# Patient Record
Sex: MALE | Race: ASIAN | Employment: OTHER | URBAN - METROPOLITAN AREA
[De-identification: names, ages, dates, MRNs, and addresses within clinical notes are randomized per-mention and may not be internally consistent; named-entity substitution may affect disease eponyms.]

---

## 2017-10-03 ENCOUNTER — APPOINTMENT (OUTPATIENT)
Dept: LAB | Age: 72
End: 2017-10-03
Attending: FAMILY MEDICINE
Payer: MEDICARE

## 2017-10-03 ENCOUNTER — OFFICE VISIT (OUTPATIENT)
Dept: FAMILY MEDICINE CLINIC | Facility: CLINIC | Age: 72
End: 2017-10-03

## 2017-10-03 VITALS
BODY MASS INDEX: 23.57 KG/M2 | RESPIRATION RATE: 16 BRPM | HEIGHT: 67 IN | HEART RATE: 72 BPM | WEIGHT: 150.19 LBS | DIASTOLIC BLOOD PRESSURE: 70 MMHG | SYSTOLIC BLOOD PRESSURE: 110 MMHG | TEMPERATURE: 98 F

## 2017-10-03 DIAGNOSIS — Z00.00 ROUTINE ADULT HEALTH MAINTENANCE: Primary | ICD-10-CM

## 2017-10-03 DIAGNOSIS — Z00.00 ROUTINE ADULT HEALTH MAINTENANCE: ICD-10-CM

## 2017-10-03 DIAGNOSIS — D64.9 ANEMIA, UNSPECIFIED TYPE: ICD-10-CM

## 2017-10-03 DIAGNOSIS — R51.9 NONINTRACTABLE HEADACHE, UNSPECIFIED CHRONICITY PATTERN, UNSPECIFIED HEADACHE TYPE: ICD-10-CM

## 2017-10-03 PROCEDURE — 36415 COLL VENOUS BLD VENIPUNCTURE: CPT

## 2017-10-03 PROCEDURE — G0439 PPPS, SUBSEQ VISIT: HCPCS | Performed by: FAMILY MEDICINE

## 2017-10-03 PROCEDURE — 90653 IIV ADJUVANT VACCINE IM: CPT | Performed by: FAMILY MEDICINE

## 2017-10-03 PROCEDURE — G0008 ADMIN INFLUENZA VIRUS VAC: HCPCS | Performed by: FAMILY MEDICINE

## 2017-10-03 PROCEDURE — G0009 ADMIN PNEUMOCOCCAL VACCINE: HCPCS | Performed by: FAMILY MEDICINE

## 2017-10-03 PROCEDURE — 84153 ASSAY OF PSA TOTAL: CPT

## 2017-10-03 PROCEDURE — 90732 PPSV23 VACC 2 YRS+ SUBQ/IM: CPT | Performed by: FAMILY MEDICINE

## 2017-10-03 PROCEDURE — 85027 COMPLETE CBC AUTOMATED: CPT

## 2017-10-03 PROCEDURE — 80061 LIPID PANEL: CPT

## 2017-10-03 PROCEDURE — 80053 COMPREHEN METABOLIC PANEL: CPT

## 2017-10-05 NOTE — PROGRESS NOTES
HPI:   Regis Gonzalez is a 81year old male who presents for a Medicare Subsequent Annual Wellness visit (Pt already had Initial Annual Wellness).       His last annual assessment has been over 1 year: Annual Physical due on 10/01/2017         Patient Care T SOCIAL HISTORY:   He  reports that he has quit smoking. He has never used smokeless tobacco. He reports that he drinks alcohol. He reports that he does not use drugs.      REVIEW OF SYSTEMS:   GENERAL: feels well otherwise  SKIN: denies any unusual skin l S1, S2 normal, no murmur, rub or gallop   Abdomen:   Soft, non-tender, bowel sounds active all four quadrants,  no masses, no organomegaly   Genitalia: Normal male   Rectal: Normal tone, normal prostate, no masses or tenderness   Extremities: Extremities n Power of [employer] on file in Epic:    Cleo Nelson does not have a Power of  for [employer] on file in [facility].  Discussed with patient and provided information           PLAN:  The patient indicates understanding of these issues and agrees to the pl affect your day to day activities?: 0-No     Have you had any memory issues?: 0-No    Fall/Risk Scorin    Scoring Interpretation: 0 - 3 No Risk     Depression Screening (PHQ-2/PHQ-9): Over the LAST 2 WEEKS   Little interest or pleasure in doing things Glaucoma Screening      Ophthalmology Visit Annually: Diabetics, FHx Glaucoma, AA>50, > 65 No flowsheet data found.     Prostate Cancer Screening      PSA  Annually PSA due on 10/03/2018  Update Health Maintenance if applicable   Immunizations

## 2017-10-26 ENCOUNTER — OFFICE VISIT (OUTPATIENT)
Dept: NEUROLOGY | Facility: CLINIC | Age: 72
End: 2017-10-26

## 2017-10-26 VITALS — SYSTOLIC BLOOD PRESSURE: 114 MMHG | DIASTOLIC BLOOD PRESSURE: 64 MMHG | RESPIRATION RATE: 18 BRPM | HEART RATE: 84 BPM

## 2017-10-26 DIAGNOSIS — G43.009 MIGRAINE WITHOUT AURA AND WITHOUT STATUS MIGRAINOSUS, NOT INTRACTABLE: ICD-10-CM

## 2017-10-26 DIAGNOSIS — R29.818 OTHER SYMPTOMS AND SIGNS INVOLVING THE NERVOUS SYSTEM: ICD-10-CM

## 2017-10-26 DIAGNOSIS — R51.9 NEW ONSET OF HEADACHES AFTER AGE 50: Primary | ICD-10-CM

## 2017-10-26 PROCEDURE — 99204 OFFICE O/P NEW MOD 45 MIN: CPT | Performed by: OTHER

## 2017-10-26 RX ORDER — SUMATRIPTAN 50 MG/1
50 TABLET, FILM COATED ORAL EVERY 2 HOUR PRN
Qty: 9 TABLET | Refills: 0 | Status: SHIPPED | OUTPATIENT
Start: 2017-10-26 | End: 2017-11-25

## 2017-10-26 NOTE — PATIENT INSTRUCTIONS
For headaches, start taking Sumatriptan 50 mg (Imitrex) within first 30 minutes of symptoms starting; if not improved after 2 hours, take additional dose, and then stop taking; monitor for chest pain / tightness; take no more than 2 times a week  Have imag 3-10 days. It is highly recommended patients contact their insurance carrier directly to determine coverage. If test is done without insurance authorization, patient may be responsible for the entire amount billed.       Precertification and Prior [employer]

## 2017-10-26 NOTE — H&P
[facility] Patient / Consult Visit    Ruthy Jackson is a 81year old male.                          Referring MD: Aurora Hernandez    Patient presents with:  Headache      HPI:    Ruthy Jackson is a 81year old, who presents for evalua DOCUMENTED NOT TO HAVE EXPERIENCED ANY* N/A      Comment: Procedure: CIRCUMCISION ADULT;  Surgeon:                Ebonie Wu MD;  Location: [address] [place] [address]  3/11/2016: PATIENT WITH PREOPERATIVE ORDER FOR IV ANTIBIO* N/A bruits    Mental status:  Alert and oriented to time, place, person, and situation  Speech: fluent  Language: normal naming, repetition, and comprehension  Memory: normal  Attention/concentration: normal    Fundoscopic Exam: optic discs sharp bilaterally CEREBELLUM:     No edema, hemorrhage, mass, acute infarction, or        inappropriate atrophy. BRAINSTEM:     No edema, hemorrhage, mass, acute infarction, or        inappropriate atrophy.        CSF SPACES:     Ventricles, cisterns, and sulci are rodolfo significant for prostate CA, who presented for evaluation of headache. Neurologic exam is nonfocal, with no cranial neuropathy or upper motor neuron signs.   Patient does have left lower extremity weakness, which is mild and chronic, from prior injur

## 2017-11-09 ENCOUNTER — HOSPITAL ENCOUNTER (OUTPATIENT)
Dept: MRI IMAGING | Facility: HOSPITAL | Age: 72
Discharge: HOME | End: 2017-11-09
Attending: Other
Payer: MEDICARE

## 2017-11-09 DIAGNOSIS — R29.818 OTHER SYMPTOMS AND SIGNS INVOLVING THE NERVOUS SYSTEM: ICD-10-CM

## 2017-11-09 DIAGNOSIS — R51.9 NEW ONSET OF HEADACHES AFTER AGE 50: ICD-10-CM

## 2017-11-09 PROCEDURE — A9575 INJ GADOTERATE MEGLUMI 0.1ML: HCPCS | Performed by: OTHER

## 2017-11-09 PROCEDURE — 70553 MRI BRAIN STEM W/O & W/DYE: CPT | Performed by: OTHER

## 2017-11-09 PROCEDURE — 70544 MR ANGIOGRAPHY HEAD W/O DYE: CPT | Performed by: OTHER

## 2017-11-21 ENCOUNTER — OFFICE VISIT (OUTPATIENT)
Dept: NEUROLOGY | Facility: CLINIC | Age: 72
End: 2017-11-21

## 2017-11-21 VITALS
HEIGHT: 67 IN | DIASTOLIC BLOOD PRESSURE: 82 MMHG | BODY MASS INDEX: 23.54 KG/M2 | SYSTOLIC BLOOD PRESSURE: 130 MMHG | RESPIRATION RATE: 16 BRPM | HEART RATE: 76 BPM | WEIGHT: 150 LBS

## 2017-11-21 DIAGNOSIS — G43.009 MIGRAINE WITHOUT AURA AND WITHOUT STATUS MIGRAINOSUS, NOT INTRACTABLE: Primary | ICD-10-CM

## 2017-11-21 PROCEDURE — 99213 OFFICE O/P EST LOW 20 MIN: CPT | Performed by: OTHER

## 2017-11-21 NOTE — PROGRESS NOTES
[employer] Progress Note    HPI  Patient presents with:  Migraine: follow up on MRI brain results      As per my initial H&P from 10/26/17:,   Frieda Cotton is a 81year old, who presents for evaluation of headache.        Patient stat cancer   • Hypertension     controlled     Past Surgical History:  3/11/2016: Justin Manuel N/A      Comment: Procedure: CIRCUMCISION ADULT;  Surgeon:                Jorge Vasquez MD;  Location: [address] [place] [address]  2007: [address] MAX, Disp: 9 tablet, Rfl: 0  •  Calcium Carbonate-Vitamin D (CALCIUM 600-D OR), Take 1 tablet by mouth daily. , Disp: , Rfl:   •  Multiple Vitamins-Minerals (B COMPLEX VITAMINS PLUS) Oral Tab, Take  by mouth., Disp: , Rfl:   •  Multiple Vitamin (ONE-DAILY M patent. There is no acute intracranial hemorrhage or extra-axial fluid collection identified. Mild chronic small vessel ischemic disease. There is no abnormal parenchymal or leptomeningeal enhancement.   There is no restricted diffusion to suggest acu diagnosis)  Plan: as noted above     Return in about 6 months (around 5/21/2018).     Milind Summers MD, Neurology  [facility]  Pager [phone]  11/21/2017

## 2017-11-21 NOTE — PATIENT INSTRUCTIONS
Refill policies:    • Allow 2-3 business days for refills; controlled substances may take longer.   • Contact your pharmacy at least 5 days prior to running out of medication and have them send an electronic request or submit request through the [facility] have a procedure or additional testing performed. [facility]) will contact your insurance carrier to obtain pre-certification or prior authorization.     Unfortunately, [facility] has seen an increase in denial of payment even though the p

## 2018-06-13 ENCOUNTER — MOBILE ENCOUNTER (OUTPATIENT)
Dept: FAMILY MEDICINE CLINIC | Facility: CLINIC | Age: 73
End: 2018-06-13

## 2018-06-13 RX ORDER — CIPROFLOXACIN 500 MG/1
500 TABLET, FILM COATED ORAL 2 TIMES DAILY
Qty: 6 TABLET | Refills: 0 | Status: SHIPPED | OUTPATIENT
Start: 2018-06-13 | End: 2018-06-16

## 2018-10-11 ENCOUNTER — OFFICE VISIT (OUTPATIENT)
Dept: FAMILY MEDICINE CLINIC | Facility: CLINIC | Age: 73
End: 2018-10-11
Payer: MEDICARE

## 2018-10-11 ENCOUNTER — APPOINTMENT (OUTPATIENT)
Dept: LAB | Age: 73
End: 2018-10-11
Attending: FAMILY MEDICINE
Payer: MEDICARE

## 2018-10-11 VITALS
RESPIRATION RATE: 12 BRPM | SYSTOLIC BLOOD PRESSURE: 118 MMHG | HEART RATE: 74 BPM | HEIGHT: 66.5 IN | WEIGHT: 149 LBS | DIASTOLIC BLOOD PRESSURE: 68 MMHG | OXYGEN SATURATION: 98 % | TEMPERATURE: 98 F | BODY MASS INDEX: 23.67 KG/M2

## 2018-10-11 DIAGNOSIS — Z00.00 ROUTINE ADULT HEALTH MAINTENANCE: Primary | ICD-10-CM

## 2018-10-11 DIAGNOSIS — Z00.00 ROUTINE ADULT HEALTH MAINTENANCE: ICD-10-CM

## 2018-10-11 PROCEDURE — 36415 COLL VENOUS BLD VENIPUNCTURE: CPT

## 2018-10-11 PROCEDURE — 84443 ASSAY THYROID STIM HORMONE: CPT

## 2018-10-11 PROCEDURE — 85027 COMPLETE CBC AUTOMATED: CPT

## 2018-10-11 PROCEDURE — 80053 COMPREHEN METABOLIC PANEL: CPT

## 2018-10-11 PROCEDURE — G0439 PPPS, SUBSEQ VISIT: HCPCS | Performed by: FAMILY MEDICINE

## 2018-10-11 PROCEDURE — 80061 LIPID PANEL: CPT

## 2018-10-11 PROCEDURE — 84439 ASSAY OF FREE THYROXINE: CPT

## 2018-10-11 PROCEDURE — 84153 ASSAY OF PSA TOTAL: CPT

## 2018-10-12 NOTE — PROGRESS NOTES
HPI:   Lachelle Hanson is a 72year old male who presents for a Medicare Subsequent Annual Wellness visit (Pt already had Initial Annual Wellness).       His last annual assessment has been over 1 year: Annual Physical due on 10/03/2018         Fall/Risk Asse Component Value Date    CHOLEST 184 10/11/2018    HDL 62 (H) 10/11/2018    LDL 99 10/11/2018    TRIG 114 10/11/2018          Last Chemistry Labs:   Lab Results   Component Value Date    AST 12 (L) 10/11/2018    ALT 20 10/11/2018    CA 8.7 10/11/2018    A anxiety  HEMATOLOGIC: denies hx of anemia  ENDOCRINE: denies thyroid history  ALL/ASTHMA: denies hx of allergy or asthma    EXAM:   /68   Pulse 74   Temp 98 °F (36.7 °C) (Oral)   Resp 12   Ht 66.5\"   Wt 149 lb   SpO2 98%   BMI 23.69 kg/m²   Estimate older (53681) 10/03/2017   • Pneumococcal (Prevnar 13) 10/01/2016   • Pneumovax 23 10/03/2017   Pended Date(s) Pended   • Pneumococcal (Prevnar 13) 10/03/2016        ASSESSMENT AND OTHER RELEVANT CHRONIC CONDITIONS:   Regis Gonzalez is a 72year old male wh Physical Exam only, or if medically necessary Electrocardiogram date01/23/2016    Colorectal Cancer Screening      Colonoscopy Screen every 10 years Colonoscopy due on 10/30/2018 Update Health Maintenance if applicable    Flex Sigmoidoscopy Screen every 10

## 2019-01-22 ENCOUNTER — TELEPHONE (OUTPATIENT)
Dept: FAMILY MEDICINE CLINIC | Facility: CLINIC | Age: 74
End: 2019-01-22

## 2019-01-22 DIAGNOSIS — M25.562 LEFT KNEE PAIN, UNSPECIFIED CHRONICITY: Primary | ICD-10-CM
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
## 2019-02-06 PROBLEM — Z86.010 PERSONAL HISTORY OF COLONIC POLYPS: Status: ACTIVE | Noted: 2019-02-06

## 2019-02-06 PROBLEM — D12.0 BENIGN NEOPLASM OF CECUM: Status: ACTIVE | Noted: 2019-02-06

## 2019-02-06 PROBLEM — R14.1 GAS PAIN: Status: ACTIVE | Noted: 2019-02-06

## 2019-02-06 PROBLEM — Z86.0100 PERSONAL HISTORY OF COLONIC POLYPS: Status: ACTIVE | Noted: 2019-02-06

## 2019-02-28 ENCOUNTER — OFFICE VISIT (OUTPATIENT)
Dept: PODIATRY | Age: 74
End: 2019-02-28

## 2019-02-28 DIAGNOSIS — M21.622 TAILOR'S BUNIONETTE, LEFT: Primary | ICD-10-CM

## 2019-02-28 DIAGNOSIS — L84 CORN OF FOOT: ICD-10-CM

## 2019-02-28 DIAGNOSIS — M72.2 PLANTAR FASCIITIS, LEFT: ICD-10-CM

## 2019-02-28 PROCEDURE — 99203 OFFICE O/P NEW LOW 30 MIN: CPT | Performed by: PODIATRIST

## 2019-02-28 RX ORDER — MULTIVITAMIN
1 TABLET ORAL
COMMUNITY

## 2019-02-28 RX ORDER — FLUTICASONE PROPIONATE 0.05 %
CREAM (GRAM) TOPICAL
COMMUNITY
Start: 2017-10-31

## 2019-03-04 PROBLEM — M21.622 TAILOR'S BUNIONETTE, LEFT: Status: ACTIVE | Noted: 2019-03-04

## 2019-03-04 PROBLEM — L84 CORN OF FOOT: Status: ACTIVE | Noted: 2019-03-04

## 2019-03-04 PROBLEM — M72.2 PLANTAR FASCIITIS, LEFT: Status: ACTIVE | Noted: 2019-03-04

## 2019-04-03 ENCOUNTER — HOSPITAL ENCOUNTER (OUTPATIENT)
Dept: CT IMAGING | Facility: HOSPITAL | Age: 74
Discharge: HOME | End: 2019-04-03
Attending: FAMILY MEDICINE

## 2019-04-03 DIAGNOSIS — Z13.9 ENCOUNTER FOR SCREENING: ICD-10-CM

## 2019-05-19 ENCOUNTER — TELEPHONE (OUTPATIENT)
Dept: FAMILY MEDICINE CLINIC | Facility: CLINIC | Age: 74
End: 2019-05-19

## 2019-05-19 DIAGNOSIS — Z85.46 H/O PROSTATE CANCER: ICD-10-CM

## 2019-05-19 DIAGNOSIS — R91.8 PULMONARY NODULES: Primary | ICD-10-CM

## 2019-05-30 ENCOUNTER — TELEPHONE (OUTPATIENT)
Dept: OTHER | Facility: HOSPITAL | Age: 74
End: 2019-05-30

## 2019-05-30 ENCOUNTER — TELEPHONE (OUTPATIENT)
Dept: FAMILY MEDICINE CLINIC | Facility: CLINIC | Age: 74
End: 2019-05-30

## 2019-05-30 DIAGNOSIS — I77.810 DILATATION OF THORACIC AORTA (HCC): Primary | ICD-10-CM

## 2019-05-30 NOTE — PROGRESS NOTES
MD office called to follow up on Pt's Heart scan form 4/3/19 pt had an incidental finding read by cardiology of Dilated Thoracic aorta 4.35 cm. Left message for nurse to call back regarding results and follow up with Pt.

## 2019-05-30 NOTE — TELEPHONE ENCOUNTER
[facility] is calling  with an Incidental finding on Heart scan done on 4/3/19.      Please call Isabelle Parra [phone]

## 2019-05-30 NOTE — TELEPHONE ENCOUNTER
Please let patient know that I recommend that he get opinion from cardiologist.  Please refer to cardiology

## 2019-05-31 NOTE — TELEPHONE ENCOUNTER
Pt informed of both test results and recommendations and he expressed understanding and agreement. Pt requested an e-mail with recommendation information as he is not at home. [employer] e-mail sent. Task completed.

## 2019-06-28 ENCOUNTER — MOBILE ENCOUNTER (OUTPATIENT)
Dept: FAMILY MEDICINE CLINIC | Facility: CLINIC | Age: 74
End: 2019-06-28

## 2019-06-29 ENCOUNTER — HOSPITAL ENCOUNTER (OUTPATIENT)
Dept: CT IMAGING | Facility: HOSPITAL | Age: 74
Discharge: HOME | End: 2019-06-29
Attending: FAMILY MEDICINE
Payer: MEDICARE

## 2019-06-29 DIAGNOSIS — Z85.46 H/O PROSTATE CANCER: ICD-10-CM

## 2019-06-29 DIAGNOSIS — R91.8 PULMONARY NODULES: ICD-10-CM

## 2019-06-29 DIAGNOSIS — S99.912A INJURY OF LEFT ANKLE, INITIAL ENCOUNTER: ICD-10-CM

## 2019-06-29 DIAGNOSIS — S99.922A INJURY OF LEFT FOOT, INITIAL ENCOUNTER: Primary | ICD-10-CM

## 2019-06-29 PROCEDURE — 71250 CT THORAX DX C-: CPT | Performed by: FAMILY MEDICINE

## 2019-07-01 ENCOUNTER — HOSPITAL ENCOUNTER (OUTPATIENT)
Dept: GENERAL RADIOLOGY | Facility: HOSPITAL | Age: 74
Discharge: HOME | End: 2019-07-01
Attending: FAMILY MEDICINE
Payer: MEDICARE

## 2019-07-01 DIAGNOSIS — M79.652 PAIN OF LEFT THIGH: Primary | ICD-10-CM

## 2019-07-01 DIAGNOSIS — M25.552 PAIN OF LEFT HIP JOINT: ICD-10-CM

## 2019-07-01 DIAGNOSIS — S99.912A INJURY OF LEFT ANKLE, INITIAL ENCOUNTER: ICD-10-CM

## 2019-07-01 DIAGNOSIS — S99.922A INJURY OF LEFT FOOT, INITIAL ENCOUNTER: ICD-10-CM

## 2019-07-01 PROCEDURE — 73630 X-RAY EXAM OF FOOT: CPT | Performed by: FAMILY MEDICINE

## 2019-07-01 PROCEDURE — 73610 X-RAY EXAM OF ANKLE: CPT | Performed by: FAMILY MEDICINE

## 2019-07-02 ENCOUNTER — HOSPITAL ENCOUNTER (OUTPATIENT)
Dept: GENERAL RADIOLOGY | Facility: HOSPITAL | Age: 74
Discharge: HOME | End: 2019-07-02
Attending: FAMILY MEDICINE
Payer: MEDICARE

## 2019-07-02 ENCOUNTER — TELEPHONE (OUTPATIENT)
Dept: FAMILY MEDICINE CLINIC | Facility: CLINIC | Age: 74
End: 2019-07-02

## 2019-07-02 DIAGNOSIS — M79.652 PAIN OF LEFT THIGH: ICD-10-CM

## 2019-07-02 DIAGNOSIS — M25.552 PAIN OF LEFT HIP JOINT: ICD-10-CM

## 2019-07-02 PROCEDURE — 73552 X-RAY EXAM OF FEMUR 2/>: CPT | Performed by: FAMILY MEDICINE

## 2019-07-02 PROCEDURE — 73502 X-RAY EXAM HIP UNI 2-3 VIEWS: CPT | Performed by: FAMILY MEDICINE
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
## 2019-07-02 NOTE — PROGRESS NOTES
Pt is having severe pain with rotation and movement of his left lower extremity over the past 1 week. The pain is mainly in the left thigh and posterior thigh. He has previous compound fracture of left femur s/p surgery with plates and screws.  This resulte

## 2019-09-08 ENCOUNTER — MOBILE ENCOUNTER (OUTPATIENT)
Dept: FAMILY MEDICINE CLINIC | Facility: CLINIC | Age: 74
End: 2019-09-08

## 2019-09-08 RX ORDER — CIPROFLOXACIN 500 MG/1
500 TABLET, FILM COATED ORAL 2 TIMES DAILY
Qty: 14 TABLET | Refills: 0 | Status: SHIPPED | OUTPATIENT
Start: 2019-09-08 | End: 2019-09-15

## 2019-09-09 ENCOUNTER — OFFICE VISIT (OUTPATIENT)
Dept: FAMILY MEDICINE CLINIC | Facility: CLINIC | Age: 74
End: 2019-09-09
Payer: MEDICARE

## 2019-09-09 ENCOUNTER — LAB ENCOUNTER (OUTPATIENT)
Dept: LAB | Age: 74
End: 2019-09-09
Attending: FAMILY MEDICINE
Payer: MEDICARE

## 2019-09-09 VITALS
DIASTOLIC BLOOD PRESSURE: 60 MMHG | HEART RATE: 80 BPM | SYSTOLIC BLOOD PRESSURE: 105 MMHG | WEIGHT: 146.19 LBS | HEIGHT: 68 IN | BODY MASS INDEX: 22.16 KG/M2 | TEMPERATURE: 99 F

## 2019-09-09 DIAGNOSIS — R30.0 DYSURIA: ICD-10-CM

## 2019-09-09 DIAGNOSIS — R31.29 OTHER MICROSCOPIC HEMATURIA: ICD-10-CM

## 2019-09-09 DIAGNOSIS — M25.552 PAIN OF LEFT HIP JOINT: ICD-10-CM

## 2019-09-09 DIAGNOSIS — R50.9 FEVER AND CHILLS: Primary | ICD-10-CM

## 2019-09-09 LAB
ANION GAP SERPL CALC-SCNC: 7 MMOL/L (ref 0–18)
BASOPHILS # BLD AUTO: 0.04 X10(3) UL (ref 0–0.2)
BASOPHILS NFR BLD AUTO: 0.3 %
BILIRUBIN URINE: NEGATIVE
BUN BLD-MCNC: 14 MG/DL (ref 7–18)
BUN/CREAT SERPL: 13.1 (ref 10–20)
CALCIUM BLD-MCNC: 9.3 MG/DL (ref 8.5–10.1)
CHLORIDE SERPL-SCNC: 103 MMOL/L (ref 98–112)
CO2 SERPL-SCNC: 27 MMOL/L (ref 21–32)
CONTROL RUN WITHIN 24 HOURS?: YES
CREAT BLD-MCNC: 1.07 MG/DL (ref 0.7–1.3)
DEPRECATED RDW RBC AUTO: 44.7 FL (ref 35.1–46.3)
EOSINOPHIL # BLD AUTO: 0.14 X10(3) UL (ref 0–0.7)
EOSINOPHIL NFR BLD AUTO: 1.2 %
ERYTHROCYTE [DISTWIDTH] IN BLOOD BY AUTOMATED COUNT: 13.1 % (ref 11–15)
GLUCOSE BLD-MCNC: 111 MG/DL (ref 70–99)
GLUCOSE URINE: NEGATIVE
HCT VFR BLD AUTO: 39.9 % (ref 39–53)
HGB BLD-MCNC: 12.6 G/DL (ref 13–17.5)
IMM GRANULOCYTES # BLD AUTO: 0.05 X10(3) UL (ref 0–1)
IMM GRANULOCYTES NFR BLD: 0.4 %
LEUKOCYTE ESTERASE URINE: NEGATIVE
LYMPHOCYTES # BLD AUTO: 2.52 X10(3) UL (ref 1–4)
LYMPHOCYTES NFR BLD AUTO: 20.9 %
MCH RBC QN AUTO: 29 PG (ref 26–34)
MCHC RBC AUTO-ENTMCNC: 31.6 G/DL (ref 31–37)
MCV RBC AUTO: 91.9 FL (ref 80–100)
MONOCYTES # BLD AUTO: 1.47 X10(3) UL (ref 0.1–1)
MONOCYTES NFR BLD AUTO: 12.2 %
NEUTROPHILS # BLD AUTO: 7.86 X10 (3) UL (ref 1.5–7.7)
NEUTROPHILS # BLD AUTO: 7.86 X10(3) UL (ref 1.5–7.7)
NEUTROPHILS NFR BLD AUTO: 65 %
NITRITE URINE: NEGATIVE
OSMOLALITY SERPL CALC.SUM OF ELEC: 285 MOSM/KG (ref 275–295)
PH URINE: 5.5 (ref 5–8)
PLATELET # BLD AUTO: 331 10(3)UL (ref 150–450)
POTASSIUM SERPL-SCNC: 4 MMOL/L (ref 3.5–5.1)
PROTEIN URINE: 30
RBC # BLD AUTO: 4.34 X10(6)UL (ref 3.8–5.8)
SODIUM SERPL-SCNC: 137 MMOL/L (ref 136–145)
SPEC GRAVITY: 1.03 (ref 1–1.03)
URINE CLARITY: CLEAR
URINE COLOR: YELLOW
UROBILINOGEN URINE: 0.2
WBC # BLD AUTO: 12.1 X10(3) UL (ref 4–11)

## 2019-09-09 PROCEDURE — 87086 URINE CULTURE/COLONY COUNT: CPT | Performed by: FAMILY MEDICINE

## 2019-09-09 PROCEDURE — 99214 OFFICE O/P EST MOD 30 MIN: CPT | Performed by: FAMILY MEDICINE

## 2019-09-09 PROCEDURE — 36415 COLL VENOUS BLD VENIPUNCTURE: CPT

## 2019-09-09 PROCEDURE — 85025 COMPLETE CBC W/AUTO DIFF WBC: CPT

## 2019-09-09 PROCEDURE — 80048 BASIC METABOLIC PNL TOTAL CA: CPT

## 2019-09-10 NOTE — PROGRESS NOTES
.HPI:   Regis Gonzalez is a 69year old male that presents for Patient presents with:  Dysuria: Frequent urination. Fever: Started since last week. Has been taking tylenol  Leg Pain: Left leg pain. Pain range 4. Patient has several different complaints. oriented, well developed, well nourished, no apparent distress.   HEENT:     Head:  Normocephalic, atraumatic    Eyes: EOMI, PERRLA, no scleral icterus, conjunctivae clear, no eye discharge    Ears: External normal. TMs normal without erythema or effusion

## 2019-09-23 ENCOUNTER — OFFICE VISIT (OUTPATIENT)
Dept: FAMILY MEDICINE CLINIC | Facility: CLINIC | Age: 74
End: 2019-09-23
Payer: MEDICARE

## 2019-09-23 VITALS
TEMPERATURE: 98 F | HEIGHT: 68 IN | WEIGHT: 147.19 LBS | SYSTOLIC BLOOD PRESSURE: 102 MMHG | HEART RATE: 75 BPM | BODY MASS INDEX: 22.31 KG/M2 | DIASTOLIC BLOOD PRESSURE: 60 MMHG

## 2019-09-23 DIAGNOSIS — N30.00 ACUTE CYSTITIS WITHOUT HEMATURIA: Primary | ICD-10-CM

## 2019-09-23 PROBLEM — R14.1 GAS PAIN: Status: RESOLVED | Noted: 2019-02-06 | Resolved: 2019-09-23

## 2019-09-23 LAB
BILIRUBIN URINE: NEGATIVE
CONTROL RUN WITHIN 24 HOURS?: YES
GLUCOSE URINE: NEGATIVE
KETONE URINE: NEGATIVE
LEUKOCYTE ESTERASE URINE: NEGATIVE
NITRITE URINE: NEGATIVE
PH URINE: 6.5 (ref 5–8)
PROTEIN URINE: NEGATIVE
SPEC GRAVITY: 1.02 (ref 1–1.03)
URINE CLARITY: CLEAR
UROBILINOGEN URINE: 0.2

## 2019-09-23 PROCEDURE — 99213 OFFICE O/P EST LOW 20 MIN: CPT | Performed by: FAMILY MEDICINE

## 2019-09-23 NOTE — PROGRESS NOTES
HPI:    Patient ID: Lachelle Hanson is a 69year old male. HPI  Patient is here for follow-up of urinary tract infection. He had urine dip done today. No symptoms.   Review of Systems  Negative except for the above         Current Outpatient Medications: Referrals:  None       #[id number]

## 2019-10-15 ENCOUNTER — OFFICE VISIT (OUTPATIENT)
Dept: FAMILY MEDICINE CLINIC | Facility: CLINIC | Age: 74
End: 2019-10-15
Payer: MEDICARE

## 2019-10-15 ENCOUNTER — APPOINTMENT (OUTPATIENT)
Dept: LAB | Age: 74
End: 2019-10-15
Attending: FAMILY MEDICINE
Payer: MEDICARE

## 2019-10-15 VITALS
RESPIRATION RATE: 16 BRPM | HEIGHT: 68 IN | SYSTOLIC BLOOD PRESSURE: 115 MMHG | WEIGHT: 146.81 LBS | HEART RATE: 64 BPM | DIASTOLIC BLOOD PRESSURE: 80 MMHG | TEMPERATURE: 98 F | BODY MASS INDEX: 22.25 KG/M2

## 2019-10-15 DIAGNOSIS — Z00.00 ROUTINE ADULT HEALTH MAINTENANCE: Primary | ICD-10-CM

## 2019-10-15 DIAGNOSIS — Z85.46 HISTORY OF PROSTATE CANCER: ICD-10-CM

## 2019-10-15 DIAGNOSIS — Z00.00 ROUTINE ADULT HEALTH MAINTENANCE: ICD-10-CM

## 2019-10-15 PROCEDURE — 84439 ASSAY OF FREE THYROXINE: CPT

## 2019-10-15 PROCEDURE — 80061 LIPID PANEL: CPT

## 2019-10-15 PROCEDURE — 90662 IIV NO PRSV INCREASED AG IM: CPT | Performed by: FAMILY MEDICINE

## 2019-10-15 PROCEDURE — 80053 COMPREHEN METABOLIC PANEL: CPT

## 2019-10-15 PROCEDURE — G0008 ADMIN INFLUENZA VIRUS VAC: HCPCS | Performed by: FAMILY MEDICINE

## 2019-10-15 PROCEDURE — G0439 PPPS, SUBSEQ VISIT: HCPCS | Performed by: FAMILY MEDICINE

## 2019-10-15 PROCEDURE — 84153 ASSAY OF PSA TOTAL: CPT

## 2019-10-15 PROCEDURE — 84443 ASSAY THYROID STIM HORMONE: CPT

## 2019-10-15 PROCEDURE — 85027 COMPLETE CBC AUTOMATED: CPT

## 2019-10-15 PROCEDURE — 36415 COLL VENOUS BLD VENIPUNCTURE: CPT

## 2019-10-16 NOTE — PROGRESS NOTES
HPI:   Regis Gonzalez is a 69year old male who presents for a Medicare Subsequent Annual Wellness visit (Pt already had Initial Annual Wellness).       His last annual assessment has been over 1 year: Annual Physical due on 10/11/2019         Fall/Risk Asse history of colonic polyps     Benign neoplasm of cecum    Wt Readings from Last 3 Encounters:  10/15/19 : 146 lb 12.8 oz (66.6 kg)  09/23/19 : 147 lb 3.2 oz (66.8 kg)  09/19/19 : 150 lb (68 kg)     Last Cholesterol Labs:   Lab Results   Component Value Chavo shortness of breath with exertion  CARDIOVASCULAR: denies chest pain on exertion  GI: denies abdominal pain, denies heartburn  : 0 per night nocturia, no complaint of urinary incontinence  MUSCULOSKELETAL: denies back pain  NEURO: denies headaches  PSYCH Pulses: 2+ and symmetric   Skin: Skin color, texture, turgor normal, no rashes or lesions   Lymph nodes: Cervical, supraclavicular, and axillary nodes normal   Neurologic: Normal            Vaccination History     Immunization History   Administered Date Lab or Procedure External Lab or Procedure   Diabetes Screening      HbgA1C   Annually No results found for: A1C    No flowsheet data found.     Fasting Blood Sugar (FSB)Annually Glucose (mg/dL)   Date Value   10/15/2019 63 (L)     GLUCOSE (mg/dL)   Date [facility] covered with your prescription benefits, but Medicare does not cover unless Medically needed    Zoster   Not covered by Medicare Part B No vaccine history found This may be covered with your pharmacy  prescription benefits

## 2019-11-01 ENCOUNTER — OFFICE VISIT (OUTPATIENT)
Dept: FAMILY MEDICINE CLINIC | Facility: CLINIC | Age: 74
End: 2019-11-01
Payer: MEDICARE

## 2019-11-01 VITALS
RESPIRATION RATE: 16 BRPM | TEMPERATURE: 98 F | DIASTOLIC BLOOD PRESSURE: 70 MMHG | SYSTOLIC BLOOD PRESSURE: 125 MMHG | WEIGHT: 148.19 LBS | BODY MASS INDEX: 22.46 KG/M2 | HEART RATE: 70 BPM | HEIGHT: 68 IN

## 2019-11-01 DIAGNOSIS — R42 DIZZINESS: ICD-10-CM

## 2019-11-01 DIAGNOSIS — R42 LIGHTHEADEDNESS: Primary | ICD-10-CM

## 2019-11-01 PROCEDURE — 99213 OFFICE O/P EST LOW 20 MIN: CPT | Performed by: FAMILY MEDICINE

## 2019-11-01 NOTE — PROGRESS NOTES
HPI:   Roland Machuca is a 69year old male that presents for Patient presents with:  Lightheadedness: Started yesterday    Patient states yesterday he had an episode where he goes to bend over and then when he come back up he feels some lightheadedness.   H or gallops. LUNGS: Clear to auscultation bilterally, no rales/rhonchi/wheezing. ABDOMEN:  Soft, nondistended, nontender, bowel sounds normal in all 4 quadrants, no masses, no hepatosplenomegaly. EXTREMITIES:  No edema, no cyanosis, 2+ radial pulses b/l.

## 2019-12-22 ENCOUNTER — MOBILE ENCOUNTER (OUTPATIENT)
Dept: FAMILY MEDICINE CLINIC | Facility: CLINIC | Age: 74
End: 2019-12-22

## 2019-12-22 RX ORDER — AMOXICILLIN AND CLAVULANATE POTASSIUM 500; 125 MG/1; MG/1
1 TABLET, FILM COATED ORAL 2 TIMES DAILY
Qty: 20 TABLET | Refills: 0 | Status: SHIPPED | OUTPATIENT
Start: 2019-12-22 | End: 2020-01-01

## 2020-05-21 ENCOUNTER — MOBILE ENCOUNTER (OUTPATIENT)
Dept: FAMILY MEDICINE CLINIC | Facility: CLINIC | Age: 75
End: 2020-05-21

## 2020-05-21 RX ORDER — AMOXICILLIN AND CLAVULANATE POTASSIUM 875; 125 MG/1; MG/1
1 TABLET, FILM COATED ORAL 2 TIMES DAILY
Qty: 20 TABLET | Refills: 0 | Status: SHIPPED | OUTPATIENT
Start: 2020-05-21 | End: 2020-05-31

## 2020-05-22 ENCOUNTER — OFFICE VISIT (OUTPATIENT)
Dept: SURGERY | Facility: CLINIC | Age: 75
End: 2020-05-22
Payer: MEDICARE

## 2020-05-22 VITALS
TEMPERATURE: 98 F | DIASTOLIC BLOOD PRESSURE: 80 MMHG | SYSTOLIC BLOOD PRESSURE: 133 MMHG | BODY MASS INDEX: 22.73 KG/M2 | WEIGHT: 150 LBS | HEIGHT: 68 IN | HEART RATE: 71 BPM

## 2020-05-22 DIAGNOSIS — L03.113 CELLULITIS OF RIGHT UPPER EXTREMITY: ICD-10-CM

## 2020-05-22 DIAGNOSIS — D17.21 LIPOMA OF RIGHT UPPER EXTREMITY: Primary | ICD-10-CM

## 2020-05-22 PROCEDURE — 11402 EXC TR-EXT B9+MARG 1.1-2 CM: CPT | Performed by: SURGERY

## 2020-05-22 PROCEDURE — 88304 TISSUE EXAM BY PATHOLOGIST: CPT | Performed by: SURGERY

## 2020-05-22 PROCEDURE — 99203 OFFICE O/P NEW LOW 30 MIN: CPT | Performed by: SURGERY

## 2020-05-22 NOTE — PROCEDURES
[address] Patient Status:  No patient class for patient encounter    1945 MRN [id number]   Location [address] [place] [address],[address] [facility] Attending No att. providers found   Hosp Day # 0 PCP MD Eder Cook applied. The patient tolerated the procedure without apparent complication.           Mary Alice Allred  5/22/2020

## 2020-05-22 NOTE — PATIENT INSTRUCTIONS
Lipoma (Removed)   A lipoma is a growth made of fatty tissue. It is not cancer (benign). It looks like a soft lump, often less than 2 inches across. A lipoma may be removed (excised) because you don’t like how it looks.  Or it may be removed if it is pain Most skin wounds heal in 10 days. But an infection may sometimes occur, even with correct treatment. Check the wound daily for the signs of infection listed below. Stitches should be taken out in 7 to 14 days. You may have surgical tape closures.  If these · Changes in the color of the skin over the lipoma  [place] last reviewed this educational content on 7/1/2019  © 8187-8992 [employer]. [address] [facility], [address] [place] [address]. All rights reserved.  This information is not intended as a traylor · Trouble or pain when moving the joints above or below the infected area  · Discharge or pus draining from the area  · Fever of 100.4°F (38°C) or higher, or as directed by your healthcare provider  · Pain that gets worse in or around the infected   · Redn

## 2020-05-22 NOTE — H&P
New Patient Visit Note       Active Problems      1. Lipoma of right upper extremity    2.  Cellulitis of right upper extremity        Chief Complaint   Patient presents with:  Abscess: NW PT ref by PCP for arm abscess - PT C/O TENDERNESS AND MILD PAIN WHEN Socioeconomic History      Marital status:       Spouse name: Not on file      Number of children: Not on file      Years of education: Not on file      Highest education level: Not on file    Tobacco Use      Smoking status: Former Smoker      Smok bruise/bleed easily. Psychiatric/Behavioral: Negative for behavioral problems and sleep disturbance.        Physical Findings   /80   Pulse 71   Temp 98.2 °F (36.8 °C) (Oral)   Ht 68\"   Wt 150 lb (68 kg)   BMI 22.81 kg/m²   Physical Exam   Constitu opportunity to ask questions. · All of the patient's questions were answered in detail. · The patient voiced understanding of the care plan.      Orders Placed This Encounter      Specimen to pathology, tissue      Imaging & Referrals   None    Follow Up

## 2020-05-28 ENCOUNTER — OFFICE VISIT (OUTPATIENT)
Dept: SURGERY | Facility: CLINIC | Age: 75
End: 2020-05-28

## 2020-05-28 VITALS
WEIGHT: 150 LBS | BODY MASS INDEX: 22.73 KG/M2 | DIASTOLIC BLOOD PRESSURE: 76 MMHG | HEART RATE: 73 BPM | TEMPERATURE: 98 F | HEIGHT: 68 IN | SYSTOLIC BLOOD PRESSURE: 128 MMHG

## 2020-05-28 DIAGNOSIS — D17.21 LIPOMA OF RIGHT UPPER EXTREMITY: Primary | ICD-10-CM

## 2020-05-28 PROBLEM — L03.113 CELLULITIS OF RIGHT UPPER EXTREMITY: Status: RESOLVED | Noted: 2020-05-22 | Resolved: 2020-05-28

## 2020-05-28 PROCEDURE — 99024 POSTOP FOLLOW-UP VISIT: CPT | Performed by: PHYSICIAN ASSISTANT

## 2020-05-28 NOTE — PROGRESS NOTES
Follow Up Visit Note       Active Problems      1.  Lipoma of right upper extremity          Chief Complaint   Incision is healing well      History of Present Illness  Patient presents today for postoperative visit following excision of lipoma from the rig Seat Belt: Yes       Current Outpatient Medications:   •  Calcium Carbonate-Vitamin D (CALCIUM 600-D OR), Take 1 tablet by mouth daily. , Disp: , Rfl:   •  Multiple Vitamin (ONE-DAILY MULTI VITAMINS) Oral Tab, Take 1 tablet by mouth daily. , Disp: , Rfl:   • Incision to the right upper extremity is healing well. Steri-Strips are in place. There is evidence of old blood on Steri-Strips without evidence of active bleeding. There is no surrounding erythema or drainage noted.    Psychiatric: He has a norm

## 2020-06-25 ENCOUNTER — OFFICE VISIT (OUTPATIENT)
Dept: SURGERY | Facility: CLINIC | Age: 75
End: 2020-06-25

## 2020-06-25 VITALS
SYSTOLIC BLOOD PRESSURE: 122 MMHG | TEMPERATURE: 98 F | RESPIRATION RATE: 16 BRPM | HEART RATE: 74 BPM | WEIGHT: 150 LBS | DIASTOLIC BLOOD PRESSURE: 78 MMHG | BODY MASS INDEX: 23 KG/M2

## 2020-06-25 DIAGNOSIS — D17.21 LIPOMA OF RIGHT UPPER EXTREMITY: Primary | ICD-10-CM

## 2020-06-25 PROCEDURE — 99024 POSTOP FOLLOW-UP VISIT: CPT | Performed by: PHYSICIAN ASSISTANT

## 2020-06-25 NOTE — PROGRESS NOTES
Follow Up Visit Note       Active Problems      1. Lipoma of right upper extremity          Chief Complaint   Patient presents with:   Follow - Up: 5/22 lipoma removal, Pt states 'stitches are coming out from where lipoma was removed.'         History of Pr socially      Drug use: No    Other Topics      Concerns:        Caffeine Concern: Yes          1-2 cups of tea daily        Exercise: Yes          walking daily        Seat Belt: Yes       Current Outpatient Medications:   •  Calcium Carbonate-Vitamin D ( no mass. There is no tenderness. There is no rebound and no guarding. No hernia. Neurological: He is alert and oriented to person, place, and time. Skin: Skin is warm and dry. He is not diaphoretic. No erythema.         The patient's right upper extremi

## 2020-11-09 ENCOUNTER — OFFICE VISIT (OUTPATIENT)
Dept: FAMILY MEDICINE CLINIC | Facility: CLINIC | Age: 75
End: 2020-11-09
Payer: MEDICARE

## 2020-11-09 VITALS
RESPIRATION RATE: 16 BRPM | SYSTOLIC BLOOD PRESSURE: 112 MMHG | OXYGEN SATURATION: 98 % | HEART RATE: 78 BPM | HEIGHT: 67 IN | BODY MASS INDEX: 22.66 KG/M2 | DIASTOLIC BLOOD PRESSURE: 69 MMHG | WEIGHT: 144.38 LBS | TEMPERATURE: 98 F

## 2020-11-09 DIAGNOSIS — C61 PROSTATE CANCER (HCC): ICD-10-CM

## 2020-11-09 DIAGNOSIS — Z85.46 H/O PROSTATE CANCER: ICD-10-CM

## 2020-11-09 DIAGNOSIS — Z00.00 ROUTINE GENERAL MEDICAL EXAMINATION AT A HEALTH CARE FACILITY: Primary | ICD-10-CM

## 2020-11-09 DIAGNOSIS — I77.810 DILATATION OF THORACIC AORTA (HCC): ICD-10-CM

## 2020-11-09 PROCEDURE — G0439 PPPS, SUBSEQ VISIT: HCPCS | Performed by: FAMILY MEDICINE

## 2020-11-10 ENCOUNTER — LAB ENCOUNTER (OUTPATIENT)
Dept: LAB | Age: 75
End: 2020-11-10
Attending: FAMILY MEDICINE
Payer: MEDICARE

## 2020-11-10 DIAGNOSIS — Z85.46 H/O PROSTATE CANCER: ICD-10-CM

## 2020-11-10 DIAGNOSIS — Z00.00 ROUTINE GENERAL MEDICAL EXAMINATION AT A HEALTH CARE FACILITY: ICD-10-CM

## 2020-11-10 PROCEDURE — 84439 ASSAY OF FREE THYROXINE: CPT

## 2020-11-10 PROCEDURE — 80053 COMPREHEN METABOLIC PANEL: CPT

## 2020-11-10 PROCEDURE — 80061 LIPID PANEL: CPT

## 2020-11-10 PROCEDURE — 84153 ASSAY OF PSA TOTAL: CPT

## 2020-11-10 PROCEDURE — 36415 COLL VENOUS BLD VENIPUNCTURE: CPT

## 2020-11-10 PROCEDURE — 85025 COMPLETE CBC W/AUTO DIFF WBC: CPT

## 2020-11-10 PROCEDURE — 84443 ASSAY THYROID STIM HORMONE: CPT

## 2020-11-10 NOTE — PROGRESS NOTES
HPI:   Roland Machuca is a 70year old male who presents for a Medicare Subsequent Annual Wellness visit (Pt already had Initial Annual Wellness). Here for checkup, doing well.    His last annual assessment has been over 1 year: Annual Physical due on 10/ colonic polyps     Benign neoplasm of cecum     Dilatation of thoracic aorta (HCC)    Wt Readings from Last 3 Encounters:  11/09/20 : 144 lb 6.4 oz (65.5 kg)  06/25/20 : 150 lb (68 kg)  05/28/20 : 150 lb (68 kg)     Last Cholesterol Labs:   Lab Results   C ST  LUNGS: denies shortness of breath with exertion  CARDIOVASCULAR: denies chest pain on exertion  GI: denies abdominal pain, denies heartburn  : 0 per night nocturia, no complaint of urinary incontinence  MUSCULOSKELETAL: denies back pain  NEURO: denie or deformity   Heart:  Regular rate and rhythm, S1, S2 normal, no murmur, rub or gallop   Abdomen:   Soft, non-tender, bowel sounds active all four quadrants,  no masses, no organomegaly   Genitalia: Normal male   Rectal: Normal tone, normal prostate, no m does the patient maintain a good energy level?: Daily Walks;Stretching  How would you describe your daily physical activity?: Light  How would you describe your current health state?: Fair  How do you maintain positive mental well-being?: Puzzles; Visiting Medium/high risk factors:   End-stage renal disease   Hemophiliacs who received Factor VIII or IX concentrates   Clients of institutions for the mentally retarded   Persons who live in the same house as a HepB virus carrier   Homosexual men   Illicit injec

## 2020-11-19 ENCOUNTER — TELEPHONE (OUTPATIENT)
Dept: FAMILY MEDICINE CLINIC | Facility: CLINIC | Age: 75
End: 2020-11-19

## 2020-11-19 NOTE — TELEPHONE ENCOUNTER
Received fax from [facility] that patient is scheduled for surgery. Per Dr. Creola Cheadle, fax OV note from 11/9/20 to [facility] at [phone] for [facility] surgery. Copy sent to scanning.

## 2021-01-05 ENCOUNTER — LAB ENCOUNTER (OUTPATIENT)
Dept: LAB | Age: 76
End: 2021-01-05
Attending: OPHTHALMOLOGY
Payer: MEDICARE

## 2021-01-05 DIAGNOSIS — Z01.818 PRE-PROCEDURAL EXAMINATION: ICD-10-CM

## 2021-01-06 LAB — SARS-COV-2 RNA RESP QL NAA+PROBE: NOT DETECTED

## 2021-02-02 ENCOUNTER — LAB ENCOUNTER (OUTPATIENT)
Dept: LAB | Age: 76
End: 2021-02-02
Attending: OPHTHALMOLOGY
Payer: MEDICARE

## 2021-02-02 DIAGNOSIS — Z01.818 PRE-PROCEDURAL EXAMINATION: ICD-10-CM

## 2021-02-03 LAB — SARS-COV-2 RNA RESP QL NAA+PROBE: NOT DETECTED

## 2021-03-12 DIAGNOSIS — Z23 NEED FOR VACCINATION: ICD-10-CM

## 2021-03-30 ENCOUNTER — TELEPHONE (OUTPATIENT)
Dept: FAMILY MEDICINE CLINIC | Facility: CLINIC | Age: 76
End: 2021-03-30

## 2021-03-30 NOTE — TELEPHONE ENCOUNTER
Pt calling to update his records. He had the [address] vaccine through [facility] on 2/16/21 and 3/17/21.

## 2021-05-24 ENCOUNTER — OFFICE VISIT (OUTPATIENT)
Dept: FAMILY MEDICINE CLINIC | Facility: CLINIC | Age: 76
End: 2021-05-24
Payer: MEDICARE

## 2021-05-24 VITALS
HEIGHT: 67.25 IN | WEIGHT: 149 LBS | HEART RATE: 81 BPM | TEMPERATURE: 98 F | RESPIRATION RATE: 16 BRPM | DIASTOLIC BLOOD PRESSURE: 64 MMHG | BODY MASS INDEX: 23.11 KG/M2 | SYSTOLIC BLOOD PRESSURE: 110 MMHG

## 2021-05-24 DIAGNOSIS — R07.81 RIB PAIN ON LEFT SIDE: Primary | ICD-10-CM

## 2021-05-24 PROCEDURE — 99213 OFFICE O/P EST LOW 20 MIN: CPT | Performed by: FAMILY MEDICINE

## 2021-05-25 ENCOUNTER — HOSPITAL ENCOUNTER (OUTPATIENT)
Dept: GENERAL RADIOLOGY | Facility: HOSPITAL | Age: 76
Discharge: HOME | End: 2021-05-25
Attending: FAMILY MEDICINE
Payer: MEDICARE

## 2021-05-25 DIAGNOSIS — R07.81 RIB PAIN ON LEFT SIDE: ICD-10-CM

## 2021-05-25 PROCEDURE — 71100 X-RAY EXAM RIBS UNI 2 VIEWS: CPT | Performed by: FAMILY MEDICINE

## 2021-05-26 NOTE — PROGRESS NOTES
HPI:   Regis Gonzalez is a 78year old male that presents for Patient presents with:  Trauma: Left sided bottom rib/painful. Patient was crawling under a deck. No bruising/swelling. It was bothering patient to breathe, it has improved.      Patient states th gallops. LUNGS: Clear to auscultation bilterally, no rales/rhonchi/wheezing. EXTREMITIES:  No edema, no cyanosis, 2+ radial pulses b/l.    NEURO:  Grossly normal   MS: There is localized moderate tenderness on the left lower anterior rib area no bony defo

## 2021-11-10 ENCOUNTER — OFFICE VISIT (OUTPATIENT)
Dept: INTERNAL MEDICINE CLINIC | Facility: CLINIC | Age: 76
End: 2021-11-10
Payer: MEDICARE

## 2021-11-10 VITALS
SYSTOLIC BLOOD PRESSURE: 134 MMHG | OXYGEN SATURATION: 99 % | TEMPERATURE: 98 F | BODY MASS INDEX: 23.22 KG/M2 | WEIGHT: 146.19 LBS | DIASTOLIC BLOOD PRESSURE: 70 MMHG | HEIGHT: 66.5 IN | RESPIRATION RATE: 16 BRPM | HEART RATE: 74 BPM

## 2021-11-10 DIAGNOSIS — I77.810 DILATATION OF THORACIC AORTA (HCC): ICD-10-CM

## 2021-11-10 DIAGNOSIS — Z85.46 HISTORY OF PROSTATE CANCER: ICD-10-CM

## 2021-11-10 DIAGNOSIS — Z86.010 PERSONAL HISTORY OF COLONIC POLYPS: ICD-10-CM

## 2021-11-10 DIAGNOSIS — Z23 NEED FOR IMMUNIZATION AGAINST INFLUENZA: ICD-10-CM

## 2021-11-10 DIAGNOSIS — R51.9 CHRONIC RIGHT-SIDED HEADACHE: ICD-10-CM

## 2021-11-10 DIAGNOSIS — G89.29 CHRONIC RIGHT-SIDED HEADACHE: ICD-10-CM

## 2021-11-10 DIAGNOSIS — H35.30 MACULAR DEGENERATION OF RIGHT EYE, UNSPECIFIED TYPE: ICD-10-CM

## 2021-11-10 DIAGNOSIS — Z00.00 ENCOUNTER FOR SUBSEQUENT ANNUAL WELLNESS VISIT (AWV) IN MEDICARE PATIENT: Primary | ICD-10-CM

## 2021-11-10 PROCEDURE — 90662 IIV NO PRSV INCREASED AG IM: CPT | Performed by: INTERNAL MEDICINE

## 2021-11-10 PROCEDURE — G0008 ADMIN INFLUENZA VIRUS VAC: HCPCS | Performed by: INTERNAL MEDICINE

## 2021-11-10 PROCEDURE — G0439 PPPS, SUBSEQ VISIT: HCPCS | Performed by: INTERNAL MEDICINE

## 2021-11-10 PROCEDURE — 99214 OFFICE O/P EST MOD 30 MIN: CPT | Performed by: INTERNAL MEDICINE

## 2021-11-10 RX ORDER — VIT A/VIT C/VIT E/ZINC/COPPER 2148-113
2 TABLET ORAL DAILY
COMMUNITY

## 2021-11-10 NOTE — PATIENT INSTRUCTIONS
- Get blood tests done when you are fasting  - Flu shot given today  - I recommend you get a COVID booster shot. You can get this at [facility] pharmacy. - Schedule CTA of thoracic aorta so that we can look into your aorta size compared to 2019.

## 2021-11-10 NOTE — PROGRESS NOTES
HPI:   Lokesh Max is a 78year old male who presents for a Medicare Subsequent Annual Wellness visit (Pt already had Initial Annual Wellness) and follow up. His last annual assessment has been over 1 year: Annual Physical due on 11/09/2021.   Shannan present), and forms available to patient in AVS     He does NOT have a Power of  for [employer] on file in [facility].    Advance care planning including the explanation and discussion of advance directives standard forms performed Face to Face with kristen seasonal.    CURRENT MEDICATIONS:   Polyethyl Glycol-Propyl Glycol (SYSTANE OP), Apply to eye daily as needed. Multiple Vitamins-Minerals (PRESERVISION AREDS) Oral Tab, Take 1 tablet by mouth daily.   diclofenac 50 MG Oral Tab EC, Take 1 tablet (50 mg tota /70 (BP Location: Left arm, Patient Position: Sitting, Cuff Size: adult)   Pulse 74   Temp 98.2 °F (36.8 °C) (Oral)   Resp 16   Ht 5' 6.5\" (1.689 m)   Wt 146 lb 3.2 oz (66.3 kg)   SpO2 99%   BMI 23.24 kg/m²   Estimated body mass index is 23.24 kg/ CONDITIONS:   Nicky Woodard is a 78year old male who presents for a Medicare Assessment. PLAN SUMMARY:   1. Encounter for subsequent annual wellness visit (AWV) in Medicare patient  2. Need for immunization against influenza  3.  History of prostate ca level?: Appropriate Exercise;Daily Walks  How would you describe your daily physical activity?: Light  How would you describe your current health state?: Fair  How do you maintain positive mental well-being?: Renae     Supplementary Documentation:   Mulu Michael Prostate-Specific Antigen (PSA) Annually Lab Results   Component Value Date    PSA <0.01 11/10/2020     PSA due on 11/10/2021   Immunizations    Influenza Covered once per flu season  Please get every year -  No recommendations at this time    Pneumococcal

## 2021-11-11 ENCOUNTER — LAB ENCOUNTER (OUTPATIENT)
Dept: LAB | Facility: HOSPITAL | Age: 76
End: 2021-11-11
Attending: INTERNAL MEDICINE
Payer: MEDICARE

## 2021-11-11 DIAGNOSIS — Z00.00 ENCOUNTER FOR SUBSEQUENT ANNUAL WELLNESS VISIT (AWV) IN MEDICARE PATIENT: ICD-10-CM

## 2021-11-11 DIAGNOSIS — Z85.46 HISTORY OF PROSTATE CANCER: ICD-10-CM

## 2021-11-11 PROCEDURE — 84443 ASSAY THYROID STIM HORMONE: CPT

## 2021-11-11 PROCEDURE — 36415 COLL VENOUS BLD VENIPUNCTURE: CPT

## 2021-11-11 PROCEDURE — 85025 COMPLETE CBC W/AUTO DIFF WBC: CPT

## 2021-11-11 PROCEDURE — 84153 ASSAY OF PSA TOTAL: CPT

## 2021-11-11 PROCEDURE — 80061 LIPID PANEL: CPT

## 2021-11-11 PROCEDURE — 80053 COMPREHEN METABOLIC PANEL: CPT

## 2022-03-03 NOTE — IMAGING NOTE
Call placed and spoke to pt regarding CTA Gated Thoracic Aorta on 3/7/22. Instructed to arrive at 1000. Instructed to drink plenty of fluids a day prior to procedure and day of procedure. May eat a light breakfast. Advised to hold caffeine 12 hrs prior to procedure. Pt to take his usual meds. Verbalized understanding.

## 2022-03-07 ENCOUNTER — HOSPITAL ENCOUNTER (OUTPATIENT)
Dept: CT IMAGING | Facility: HOSPITAL | Age: 77
Discharge: HOME | End: 2022-03-07
Attending: INTERNAL MEDICINE
Payer: MEDICARE

## 2022-03-07 VITALS — SYSTOLIC BLOOD PRESSURE: 132 MMHG | HEART RATE: 68 BPM | DIASTOLIC BLOOD PRESSURE: 82 MMHG

## 2022-03-07 DIAGNOSIS — I77.810 DILATATION OF THORACIC AORTA (HCC): ICD-10-CM

## 2022-03-07 LAB — CREAT BLD-MCNC: 1.1 MG/DL

## 2022-03-07 PROCEDURE — 82565 ASSAY OF CREATININE: CPT

## 2022-03-07 PROCEDURE — 71275 CT ANGIOGRAPHY CHEST: CPT | Performed by: INTERNAL MEDICINE

## 2022-03-07 RX ORDER — IOHEXOL 350 MG/ML
90 INJECTION, SOLUTION INTRAVENOUS
Status: COMPLETED | OUTPATIENT
Start: 2022-03-07 | End: 2022-03-07

## 2022-03-07 RX ADMIN — IOHEXOL 90 ML: 350 INJECTION, SOLUTION INTRAVENOUS at 10:50:00

## 2022-03-07 NOTE — IMAGING NOTE
Received Rubi Cavazos to CT Rm 4 working with Eriberto Pillai. Verified name, , allergies. IV access with 18G angiocath to right antecubital area. O2 applied via nasal cannula @ 2LPM.  Positioned pt on table. Procedure explained and questions answered. Vital signs monitored and noted in Flowsheet. Contrast = 90ml  0.9 NS flush = 50ml  Average HR = 138/82  Contrast injected followed by saline flush at 10:39am    Patient tolerated the procedure without complication. Denies any contrast reaction. VSS see flowsheet. Discontinued IV saline lock. Coban dressing applied which clean, dry and intact. Escorted pt to [facility] Dressing Room and discharged in stable condition.

## 2022-06-16 ENCOUNTER — ORDER TRANSCRIPTION (OUTPATIENT)
Dept: ADMINISTRATIVE | Facility: HOSPITAL | Age: 77
End: 2022-06-16

## 2022-06-16 DIAGNOSIS — Z01.818 PREOP EXAMINATION: Primary | ICD-10-CM

## 2022-06-24 ENCOUNTER — LAB ENCOUNTER (OUTPATIENT)
Dept: LAB | Facility: HOSPITAL | Age: 77
End: 2022-06-24
Attending: INTERNAL MEDICINE
Payer: MEDICARE

## 2022-06-24 DIAGNOSIS — Z01.818 PREOP EXAMINATION: ICD-10-CM

## 2022-06-24 LAB — SARS-COV-2 RNA RESP QL NAA+PROBE: NOT DETECTED

## 2022-06-27 PROBLEM — D12.8 BENIGN NEOPLASM OF RECTUM: Status: ACTIVE | Noted: 2022-06-27

## 2022-06-27 PROBLEM — L29.0 PRURITUS ANI: Status: ACTIVE | Noted: 2022-06-27

## 2022-11-10 ENCOUNTER — HOSPITAL ENCOUNTER (OUTPATIENT)
Age: 77
Discharge: HOME | End: 2022-11-10
Payer: MEDICARE

## 2022-11-10 VITALS
HEIGHT: 68 IN | RESPIRATION RATE: 18 BRPM | WEIGHT: 150 LBS | DIASTOLIC BLOOD PRESSURE: 80 MMHG | BODY MASS INDEX: 22.73 KG/M2 | OXYGEN SATURATION: 96 % | TEMPERATURE: 98 F | HEART RATE: 72 BPM | SYSTOLIC BLOOD PRESSURE: 156 MMHG
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
## 2022-11-10 DIAGNOSIS — S31.825A DOG BITE OF LEFT BUTTOCK, INITIAL ENCOUNTER: Primary | ICD-10-CM

## 2022-11-10 PROCEDURE — 90471 IMMUNIZATION ADMIN: CPT

## 2022-11-10 PROCEDURE — 99213 OFFICE O/P EST LOW 20 MIN: CPT

## 2022-11-10 PROCEDURE — 99203 OFFICE O/P NEW LOW 30 MIN: CPT

## 2022-11-10 RX ORDER — AMOXICILLIN AND CLAVULANATE POTASSIUM 875; 125 MG/1; MG/1
1 TABLET, FILM COATED ORAL 2 TIMES DAILY
Qty: 10 TABLET | Refills: 0 | Status: SHIPPED | OUTPATIENT
Start: 2022-11-10 | End: 2022-11-15

## 2022-11-10 NOTE — DISCHARGE INSTRUCTIONS
Keep area clean and dry. Return for any fever, headaches.   Attempted to ensure the dog has had all its rabies vaccinations, if not go directly to ER for rabies series

## 2022-11-15 ENCOUNTER — PATIENT MESSAGE (OUTPATIENT)
Dept: INTERNAL MEDICINE CLINIC | Facility: CLINIC | Age: 77
End: 2022-11-15

## 2022-11-15 NOTE — TELEPHONE ENCOUNTER
As long as bite wound is healing well (no warmth, pus, drainage, redness, swelling) he should be fine. If he has any of these symptoms we may want to do another round of antibiotics.

## 2022-11-15 NOTE — TELEPHONE ENCOUNTER
From: Joan Umanzor  To: Andrés Perez MD  Sent: 11/15/2022 11:02 AM CST  Subject: Dog Bite    I had a dog bite on 11-9-22 and on 11-10-22 had ER service at [facility] facility.  treated me for TDAP immunization and 5 days antibiotics. I am done with antibiotics. I don't have any fever and headaches. I don't have any information dog rabies vaccination. What is my next procedure for the incident happen to me?   [facility] Jan 07, 1945)

## 2022-11-16 NOTE — TELEPHONE ENCOUNTER
From: Ryan Kline  To: Temi Philip MD  Sent: 11/15/2022 11:02 AM CST  Subject: Dog Bite    I had a dog bite on 11-9-22 and on 11-10-22 had ER service at [facility] facility.  treated me for TDAP immunization and 5 days antibiotics. I am done with antibiotics. I don't have any fever and headaches. I don't have any information dog rabies vaccination. What is my next procedure for the incident happen to me?   [facility] Jan 07, 1945)

## 2022-12-02 ENCOUNTER — OFFICE VISIT (OUTPATIENT)
Dept: INTERNAL MEDICINE CLINIC | Facility: CLINIC | Age: 77
End: 2022-12-02
Payer: MEDICARE

## 2022-12-02 VITALS
RESPIRATION RATE: 16 BRPM | HEART RATE: 78 BPM | TEMPERATURE: 98 F | BODY MASS INDEX: 22.19 KG/M2 | OXYGEN SATURATION: 100 % | SYSTOLIC BLOOD PRESSURE: 122 MMHG | HEIGHT: 68 IN | DIASTOLIC BLOOD PRESSURE: 66 MMHG | WEIGHT: 146.38 LBS

## 2022-12-02 DIAGNOSIS — L29.0 PRURITUS ANI: ICD-10-CM

## 2022-12-02 DIAGNOSIS — R51.9 CHRONIC RIGHT-SIDED HEADACHE: ICD-10-CM

## 2022-12-02 DIAGNOSIS — Z85.46 HISTORY OF PROSTATE CANCER: ICD-10-CM

## 2022-12-02 DIAGNOSIS — I77.810 DILATATION OF THORACIC AORTA (HCC): Chronic | ICD-10-CM

## 2022-12-02 DIAGNOSIS — H35.30 MACULAR DEGENERATION OF RIGHT EYE, UNSPECIFIED TYPE: ICD-10-CM

## 2022-12-02 DIAGNOSIS — Z86.010 PERSONAL HISTORY OF COLONIC POLYPS: ICD-10-CM

## 2022-12-02 DIAGNOSIS — Z23 NEED FOR IMMUNIZATION AGAINST INFLUENZA: ICD-10-CM

## 2022-12-02 DIAGNOSIS — G89.29 CHRONIC RIGHT-SIDED HEADACHE: ICD-10-CM

## 2022-12-02 DIAGNOSIS — Z00.00 ENCOUNTER FOR SUBSEQUENT ANNUAL WELLNESS VISIT (AWV) IN MEDICARE PATIENT: Primary | ICD-10-CM

## 2022-12-02 NOTE — PATIENT INSTRUCTIONS
- Blood pressure looks good  - Flu shot given today  - Ok to continue Tylenol and Excedrin as needed  - Get blood tests done when fasting (water and medications only for 8 hours)  - Follow up in 1 year for Medicare Wellness visit/chronic issues; follow up earlier as needed. It was a pleasure seeing you in the clinic today. Thank you for choosing the [facility] office for your healthcare needs. Please call at [phone] with any questions or concerns.     Lucio Cotton MD

## 2022-12-03 ENCOUNTER — LAB ENCOUNTER (OUTPATIENT)
Dept: LAB | Facility: HOSPITAL | Age: 77
End: 2022-12-03
Attending: INTERNAL MEDICINE
Payer: MEDICARE

## 2022-12-03 DIAGNOSIS — Z85.46 HISTORY OF PROSTATE CANCER: ICD-10-CM

## 2022-12-03 DIAGNOSIS — Z00.00 ENCOUNTER FOR SUBSEQUENT ANNUAL WELLNESS VISIT (AWV) IN MEDICARE PATIENT: ICD-10-CM

## 2022-12-03 LAB
ALBUMIN SERPL-MCNC: 3.7 G/DL (ref 3.4–5)
ALBUMIN/GLOB SERPL: 1 {RATIO} (ref 1–2)
ALP LIVER SERPL-CCNC: 75 U/L
ALT SERPL-CCNC: 25 U/L
ANION GAP SERPL CALC-SCNC: 5 MMOL/L (ref 0–18)
AST SERPL-CCNC: 20 U/L (ref 15–37)
BASOPHILS # BLD AUTO: 0.03 X10(3) UL (ref 0–0.2)
BASOPHILS NFR BLD AUTO: 0.3 %
BILIRUB SERPL-MCNC: 0.7 MG/DL (ref 0.1–2)
BUN BLD-MCNC: 13 MG/DL (ref 7–18)
CALCIUM BLD-MCNC: 8.9 MG/DL (ref 8.5–10.1)
CHLORIDE SERPL-SCNC: 109 MMOL/L (ref 98–112)
CHOLEST SERPL-MCNC: 195 MG/DL (ref ?–200)
CO2 SERPL-SCNC: 26 MMOL/L (ref 21–32)
CREAT BLD-MCNC: 1.12 MG/DL
EOSINOPHIL # BLD AUTO: 0.21 X10(3) UL (ref 0–0.7)
EOSINOPHIL NFR BLD AUTO: 2.2 %
ERYTHROCYTE [DISTWIDTH] IN BLOOD BY AUTOMATED COUNT: 13 %
FASTING PATIENT LIPID ANSWER: YES
FASTING STATUS PATIENT QL REPORTED: YES
GFR SERPLBLD BASED ON 1.73 SQ M-ARVRAT: 69 ML/MIN/1.73M2 (ref 60–?)
GLOBULIN PLAS-MCNC: 3.6 G/DL (ref 2.8–4.4)
GLUCOSE BLD-MCNC: 90 MG/DL (ref 70–99)
HCT VFR BLD AUTO: 43 %
HDLC SERPL-MCNC: 71 MG/DL (ref 40–59)
HGB BLD-MCNC: 14.1 G/DL
IMM GRANULOCYTES # BLD AUTO: 0.01 X10(3) UL (ref 0–1)
IMM GRANULOCYTES NFR BLD: 0.1 %
LDLC SERPL CALC-MCNC: 103 MG/DL (ref ?–100)
LYMPHOCYTES # BLD AUTO: 3.61 X10(3) UL (ref 1–4)
LYMPHOCYTES NFR BLD AUTO: 37.3 %
MCH RBC QN AUTO: 30.4 PG (ref 26–34)
MCHC RBC AUTO-ENTMCNC: 32.8 G/DL (ref 31–37)
MCV RBC AUTO: 92.7 FL
MONOCYTES # BLD AUTO: 0.9 X10(3) UL (ref 0.1–1)
MONOCYTES NFR BLD AUTO: 9.3 %
NEUTROPHILS # BLD AUTO: 4.92 X10 (3) UL (ref 1.5–7.7)
NEUTROPHILS # BLD AUTO: 4.92 X10(3) UL (ref 1.5–7.7)
NEUTROPHILS NFR BLD AUTO: 50.8 %
NONHDLC SERPL-MCNC: 124 MG/DL (ref ?–130)
OSMOLALITY SERPL CALC.SUM OF ELEC: 290 MOSM/KG (ref 275–295)
PLATELET # BLD AUTO: 272 10(3)UL (ref 150–450)
POTASSIUM SERPL-SCNC: 4.2 MMOL/L (ref 3.5–5.1)
PROT SERPL-MCNC: 7.3 G/DL (ref 6.4–8.2)
PSA SERPL-MCNC: <0.01 NG/ML (ref ?–4)
RBC # BLD AUTO: 4.64 X10(6)UL
SODIUM SERPL-SCNC: 140 MMOL/L (ref 136–145)
TRIGL SERPL-MCNC: 120 MG/DL (ref 30–149)
VLDLC SERPL CALC-MCNC: 20 MG/DL (ref 0–30)
WBC # BLD AUTO: 9.7 X10(3) UL (ref 4–11)

## 2022-12-03 PROCEDURE — 80053 COMPREHEN METABOLIC PANEL: CPT

## 2022-12-03 PROCEDURE — 85025 COMPLETE CBC W/AUTO DIFF WBC: CPT

## 2022-12-03 PROCEDURE — 36415 COLL VENOUS BLD VENIPUNCTURE: CPT

## 2022-12-03 PROCEDURE — 80061 LIPID PANEL: CPT

## 2022-12-03 PROCEDURE — 84153 ASSAY OF PSA TOTAL: CPT

## 2023-01-01 ENCOUNTER — PATIENT MESSAGE (OUTPATIENT)
Dept: INTERNAL MEDICINE CLINIC | Facility: CLINIC | Age: 78
End: 2023-01-01

## 2023-01-03 NOTE — TELEPHONE ENCOUNTER
From: Nate Pittman  To: Herminia Mast MD  Sent: 1/1/2023 2:58 PM CST  Subject: Change of pharmacy     Please update this change  [address]  Phone [phone]  Thanks  Poly Goldstein

## 2023-11-16 DIAGNOSIS — Z00.00 PREVENTATIVE HEALTH CARE: Primary | ICD-10-CM

## 2023-11-16 DIAGNOSIS — Z85.46 HISTORY OF PROSTATE CANCER: ICD-10-CM

## 2023-11-16 DIAGNOSIS — I77.810 DILATATION OF THORACIC AORTA (HCC): Chronic | ICD-10-CM

## 2023-12-01 ENCOUNTER — LAB ENCOUNTER (OUTPATIENT)
Dept: LAB | Age: 78
End: 2023-12-01
Attending: INTERNAL MEDICINE
Payer: MEDICARE

## 2023-12-01 DIAGNOSIS — Z00.00 PREVENTATIVE HEALTH CARE: ICD-10-CM

## 2023-12-01 DIAGNOSIS — Z85.46 HISTORY OF PROSTATE CANCER: ICD-10-CM

## 2023-12-01 LAB
ALBUMIN SERPL-MCNC: 3.6 G/DL (ref 3.4–5)
ALBUMIN/GLOB SERPL: 1.1 {RATIO} (ref 1–2)
ALP LIVER SERPL-CCNC: 75 U/L
ALT SERPL-CCNC: 19 U/L
ANION GAP SERPL CALC-SCNC: 3 MMOL/L (ref 0–18)
AST SERPL-CCNC: 21 U/L (ref 15–37)
BASOPHILS # BLD AUTO: 0.06 X10(3) UL (ref 0–0.2)
BASOPHILS NFR BLD AUTO: 0.7 %
BILIRUB SERPL-MCNC: 0.6 MG/DL (ref 0.1–2)
BUN BLD-MCNC: 14 MG/DL (ref 9–23)
CALCIUM BLD-MCNC: 9 MG/DL (ref 8.5–10.1)
CHLORIDE SERPL-SCNC: 110 MMOL/L (ref 98–112)
CHOLEST SERPL-MCNC: 193 MG/DL (ref ?–200)
CO2 SERPL-SCNC: 29 MMOL/L (ref 21–32)
CREAT BLD-MCNC: 1.12 MG/DL
EGFRCR SERPLBLD CKD-EPI 2021: 68 ML/MIN/1.73M2 (ref 60–?)
EOSINOPHIL # BLD AUTO: 0.2 X10(3) UL (ref 0–0.7)
EOSINOPHIL NFR BLD AUTO: 2.5 %
ERYTHROCYTE [DISTWIDTH] IN BLOOD BY AUTOMATED COUNT: 13 %
FASTING PATIENT LIPID ANSWER: YES
FASTING STATUS PATIENT QL REPORTED: YES
GLOBULIN PLAS-MCNC: 3.3 G/DL (ref 2.8–4.4)
GLUCOSE BLD-MCNC: 90 MG/DL (ref 70–99)
HCT VFR BLD AUTO: 41.1 %
HDLC SERPL-MCNC: 67 MG/DL (ref 40–59)
HGB BLD-MCNC: 13.6 G/DL
IMM GRANULOCYTES # BLD AUTO: 0.02 X10(3) UL (ref 0–1)
IMM GRANULOCYTES NFR BLD: 0.2 %
LDLC SERPL CALC-MCNC: 112 MG/DL (ref ?–100)
LYMPHOCYTES # BLD AUTO: 3.31 X10(3) UL (ref 1–4)
LYMPHOCYTES NFR BLD AUTO: 41.1 %
MCH RBC QN AUTO: 30.2 PG (ref 26–34)
MCHC RBC AUTO-ENTMCNC: 33.1 G/DL (ref 31–37)
MCV RBC AUTO: 91.1 FL
MONOCYTES # BLD AUTO: 0.66 X10(3) UL (ref 0.1–1)
MONOCYTES NFR BLD AUTO: 8.2 %
NEUTROPHILS # BLD AUTO: 3.8 X10 (3) UL (ref 1.5–7.7)
NEUTROPHILS # BLD AUTO: 3.8 X10(3) UL (ref 1.5–7.7)
NEUTROPHILS NFR BLD AUTO: 47.3 %
NONHDLC SERPL-MCNC: 126 MG/DL (ref ?–130)
OSMOLALITY SERPL CALC.SUM OF ELEC: 294 MOSM/KG (ref 275–295)
PLATELET # BLD AUTO: 274 10(3)UL (ref 150–450)
POTASSIUM SERPL-SCNC: 4.8 MMOL/L (ref 3.5–5.1)
PROT SERPL-MCNC: 6.9 G/DL (ref 6.4–8.2)
PSA SERPL-MCNC: <0.01 NG/ML (ref ?–4)
RBC # BLD AUTO: 4.51 X10(6)UL
SODIUM SERPL-SCNC: 142 MMOL/L (ref 136–145)
TRIGL SERPL-MCNC: 78 MG/DL (ref 30–149)
VLDLC SERPL CALC-MCNC: 13 MG/DL (ref 0–30)
WBC # BLD AUTO: 8.1 X10(3) UL (ref 4–11)

## 2023-12-01 PROCEDURE — 80061 LIPID PANEL: CPT

## 2023-12-01 PROCEDURE — 85025 COMPLETE CBC W/AUTO DIFF WBC: CPT

## 2023-12-01 PROCEDURE — 36415 COLL VENOUS BLD VENIPUNCTURE: CPT

## 2023-12-01 PROCEDURE — 84153 ASSAY OF PSA TOTAL: CPT

## 2023-12-01 PROCEDURE — 80053 COMPREHEN METABOLIC PANEL: CPT

## 2023-12-04 ENCOUNTER — OFFICE VISIT (OUTPATIENT)
Dept: INTERNAL MEDICINE CLINIC | Facility: CLINIC | Age: 78
End: 2023-12-04
Payer: MEDICARE

## 2023-12-04 VITALS
SYSTOLIC BLOOD PRESSURE: 110 MMHG | OXYGEN SATURATION: 99 % | BODY MASS INDEX: 21.94 KG/M2 | DIASTOLIC BLOOD PRESSURE: 64 MMHG | HEART RATE: 81 BPM | WEIGHT: 139.81 LBS | RESPIRATION RATE: 16 BRPM | HEIGHT: 66.75 IN | TEMPERATURE: 97 F

## 2023-12-04 DIAGNOSIS — G89.29 CHRONIC RIGHT-SIDED HEADACHE: ICD-10-CM

## 2023-12-04 DIAGNOSIS — Z85.46 HISTORY OF PROSTATE CANCER: ICD-10-CM

## 2023-12-04 DIAGNOSIS — H35.30 MACULAR DEGENERATION OF RIGHT EYE, UNSPECIFIED TYPE: ICD-10-CM

## 2023-12-04 DIAGNOSIS — Z00.00 ENCOUNTER FOR SUBSEQUENT ANNUAL WELLNESS VISIT (AWV) IN MEDICARE PATIENT: Primary | ICD-10-CM

## 2023-12-04 DIAGNOSIS — R51.9 CHRONIC RIGHT-SIDED HEADACHE: ICD-10-CM

## 2023-12-04 DIAGNOSIS — E78.00 ELEVATED LDL CHOLESTEROL LEVEL: ICD-10-CM

## 2023-12-04 DIAGNOSIS — I77.810 DILATATION OF THORACIC AORTA (HCC): Chronic | ICD-10-CM

## 2023-12-04 DIAGNOSIS — L29.0 PRURITUS ANI: ICD-10-CM

## 2023-12-04 DIAGNOSIS — Z23 NEED FOR IMMUNIZATION AGAINST INFLUENZA: ICD-10-CM

## 2023-12-04 NOTE — PATIENT INSTRUCTIONS
- LDL/bad cholesterol was a little high. Keep an eye on diet. See handout on cholesterol and diet. - Get repeat fasting cholesterol blood test done in 6 months.  - Schedule echocardiogram (heart ultrasound) for next summer. Call central scheduling at [phone] to schedule. - Get flu shot and COVID booster shot at your local pharmacy ([facility] [employer]) by the end of this month. - Follow up in 1 year for Annual Wellness Visit/chronic issues; follow up earlier as needed. It was a pleasure seeing you in the clinic today. Thank you for choosing the [facility] office for your healthcare needs. Please call at [phone] with any questions or concerns.     Maty Martin MD

## 2024-03-21 ENCOUNTER — OFFICE VISIT (OUTPATIENT)
Dept: INTERNAL MEDICINE CLINIC | Facility: CLINIC | Age: 79
End: 2024-03-21
Payer: MEDICARE

## 2024-03-21 VITALS
SYSTOLIC BLOOD PRESSURE: 128 MMHG | DIASTOLIC BLOOD PRESSURE: 80 MMHG | RESPIRATION RATE: 16 BRPM | BODY MASS INDEX: 22.54 KG/M2 | HEART RATE: 106 BPM | HEIGHT: 66.75 IN | OXYGEN SATURATION: 99 % | WEIGHT: 143.63 LBS | TEMPERATURE: 98 F

## 2024-03-21 DIAGNOSIS — J22 ACUTE LOWER RESPIRATORY INFECTION: Primary | ICD-10-CM

## 2024-03-21 PROCEDURE — 99213 OFFICE O/P EST LOW 20 MIN: CPT | Performed by: INTERNAL MEDICINE

## 2024-03-21 RX ORDER — CODEINE PHOSPHATE AND GUAIFENESIN 10; 100 MG/5ML; MG/5ML
5 SOLUTION ORAL 3 TIMES DAILY PRN
Qty: 118 ML | Refills: 0 | Status: SHIPPED | OUTPATIENT
Start: 2024-03-21

## 2024-03-21 RX ORDER — AZITHROMYCIN 250 MG/1
TABLET, FILM COATED ORAL
Qty: 6 TABLET | Refills: 0 | Status: SHIPPED | OUTPATIENT
Start: 2024-03-21 | End: 2024-03-25

## 2024-03-21 NOTE — PROGRESS NOTES
Buck Melgar is a 79 year old male.   HPI:     Patient presents with acute cough.  Going on for the past week.  Started with irritated/itchy throat.  Does get allergies around this time of year. Hard for him to clear out his throat at times.  Has been using OTC cough syrup with minimal improvement of symptoms.  Hard for him to sleep at night now due to cough.  Intermittent coughing fits.  No shortness of breath.    Past medical, family, surgical and social history were reviewed as listed in the chart, and are unchanged from previous visit on 12/4/2023  REVIEW OF SYSTEMS:   GENERAL/ const: no fevers/chills, no unintentional weight loss  EYES:no vision problems  HEENT: nasal congestion; denies sinus pain or sinus tenderness  LUNGS: cough; denies shortness of breath   CARDIOVASCULAR: denies chest pain  GI: denies nausea/emesis/ abdominal pain diarrhea constipation  : denies dysuria   ALLERGY:   Allergies   Allergen Reactions    Seasonal ITCHING     PAST HISTORY:     Current Outpatient Medications:     guaiFENesin-codeine 100-10 MG/5ML Oral Solution, Take 5 mL by mouth 3 (three) times daily as needed for cough or congestion., Disp: 118 mL, Rfl: 0    azithromycin 250 MG Oral Tab, Take 2 tablets (500 mg total) by mouth daily for 1 day, THEN 1 tablet (250 mg total) daily for 4 days., Disp: 6 tablet, Rfl: 0    Polyethyl Glycol-Propyl Glycol (SYSTANE OP), Apply to eye daily as needed., Disp: , Rfl:     Multiple Vitamins-Minerals (PRESERVISION AREDS) Oral Tab, Take 2 tablets by mouth daily., Disp: , Rfl:     Calcium Carbonate-Vitamin D (CALCIUM 600-D OR), Take 1 tablet by mouth daily., Disp: , Rfl:     Multiple Vitamin (ONE-DAILY MULTI VITAMINS) Oral Tab, Take 1 tablet by mouth daily., Disp: , Rfl:   Medical:  has a past medical history of Anemia (9/24/2016), Blood in the stool, Cancer (HCC) (2007), Change in hair, Hearing loss, Hemorrhoids, Hypertension, Macular degeneration, Sleep disturbance, Stool incontinence, and  Wears glasses.  Surgical:  has a past surgical history that includes other surgical history (2007); other surgical history (Left, 1991); circumcision,othr (N/A, 3/11/2016); patient with preoperative order for iv antibiotic surgical site infect (N/A, 3/11/2016); patient documented not to have experienced any of the following events (N/A, 3/11/2016); colonoscopy; and laparoscopy, surgical prostatectomy, retropubic radical, w/nerve sparing.  Family: Family history is unknown by patient.  Social:  reports that he has quit smoking. His smoking use included cigarettes. He has never used smokeless tobacco. He reports that he does not currently use alcohol. He reports that he does not use drugs.  Wt Readings from Last 6 Encounters:   03/21/24 143 lb 9.6 oz (65.1 kg)   12/04/23 139 lb 12.8 oz (63.4 kg)   12/02/22 146 lb 6.4 oz (66.4 kg)   11/10/22 150 lb (68 kg)   06/27/22 145 lb (65.8 kg)   01/31/22 145 lb 6.4 oz (66 kg)     EXAM:   /80 (BP Location: Left arm, Patient Position: Sitting, Cuff Size: adult)   Pulse 106   Temp 97.6 °F (36.4 °C) (Temporal)   Resp 16   Ht 5' 6.75\" (1.695 m)   Wt 143 lb 9.6 oz (65.1 kg)   SpO2 99%   BMI 22.66 kg/m²   GENERAL: Alert and oriented, well developed, well nourished,in no apparent distress  HEENT: atraumatic, PERRLA, EOMI, normal lid and conjunctiva  NECK: supple, no jvd, no thyromegaly  LUNGS: clear to auscultation bilaterally, no wheezing/rubs  CARDIO: RRR without murmurs.  No clubbing, cyanosis or edema.  GI: soft non tender nondistended no hepatosplenomegaly, bowel sounds throughout  PSYCH: pleasant, appropriate mood and affect  ASSESSMENT AND PLAN:   1. Acute lower respiratory infection  Patient with persistent cough/coughing fits for 6 days.  Hard to clear out his throat.  Does get allergies around this time of year but usually not with such severe cough or trouble clearing his throat.  Will start on azithromycin, guaifenesin-codeine syrup.  Advised patient not to  drive after taking codeine syrup due to potential drowsiness.  - guaiFENesin-codeine 100-10 MG/5ML Oral Solution; Take 5 mL by mouth 3 (three) times daily as needed for cough or congestion.  Dispense: 118 mL; Refill: 0  - azithromycin 250 MG Oral Tab; Take 2 tablets (500 mg total) by mouth daily for 1 day, THEN 1 tablet (250 mg total) daily for 4 days.  Dispense: 6 tablet; Refill: 0    Patient Care Team:  Maria De Jesus Yip MD as PCP - General (Internal Medicine)  Rosa Reese MD (Radiation Oncology)  Cristopher Stanford, RN as Registered Nurse (Registered Nurse)  Abby Mondragon DO (GASTROENTEROLOGY)  Wade Durant MD as Consulting Physician (NEUROLOGY)  The patient indicates understanding of these issues and agrees to the plan.  The patient is asked to return to clinic in December 2024 for follow up on chronic issues/Medicare Wellness Visit, or earlier if acute issues arise.    Maria De Jesus Yip MD

## 2024-03-21 NOTE — PATIENT INSTRUCTIONS
- Start antibiotics, azithromycin.  Take as prescribed.  - Start prescription cough syrup.  Take 3 times daily as needed.  This medication can make you drowsy, so do not drive after taking it.  - Also take daily nasal spray (Flonase et cetera) and allergy tablet.  - Call us if you are not better with 1-2 weeks.  We may check a chest x-ray at that point.  - Otherwise follow up in December for your Annual Wellness Visit; follow up earlier as needed.    It was a pleasure seeing you in the clinic today.  Thank you for choosing the [facility] office for your healthcare needs. Please call at [phone] with any questions or concerns.    Maria De Jesus Yip MD

## 2024-05-01 ENCOUNTER — HOSPITAL ENCOUNTER (EMERGENCY)
Age: 79
Discharge: HOME | End: 2024-05-02
Attending: STUDENT IN AN ORGANIZED HEALTH CARE EDUCATION/TRAINING PROGRAM
Payer: MEDICARE

## 2024-05-01 ENCOUNTER — APPOINTMENT (OUTPATIENT)
Dept: ULTRASOUND IMAGING | Age: 79
End: 2024-05-01
Payer: MEDICARE

## 2024-05-01 VITALS
OXYGEN SATURATION: 99 % | DIASTOLIC BLOOD PRESSURE: 88 MMHG | HEIGHT: 68 IN | BODY MASS INDEX: 21.98 KG/M2 | HEART RATE: 67 BPM | RESPIRATION RATE: 17 BRPM | SYSTOLIC BLOOD PRESSURE: 145 MMHG | TEMPERATURE: 98 F | WEIGHT: 145 LBS

## 2024-05-01 DIAGNOSIS — I82.4Y2 ACUTE DEEP VEIN THROMBOSIS (DVT) OF PROXIMAL VEIN OF LEFT LOWER EXTREMITY (HCC): Primary | ICD-10-CM

## 2024-05-01 PROCEDURE — 99284 EMERGENCY DEPT VISIT MOD MDM: CPT

## 2024-05-01 PROCEDURE — 93971 EXTREMITY STUDY: CPT | Performed by: STUDENT IN AN ORGANIZED HEALTH CARE EDUCATION/TRAINING PROGRAM

## 2024-05-02 ENCOUNTER — TELEPHONE (OUTPATIENT)
Dept: INTERNAL MEDICINE CLINIC | Facility: CLINIC | Age: 79
End: 2024-05-02

## 2024-05-02 NOTE — TELEPHONE ENCOUNTER
S/w patient, requesting ED follow up visit. Scheduled for 5/7.  Patient does currently have Xarelto rx given from hospital and is taking medication. States has to  additional supply from pharmacy.   Advised to reach out to Hematology and  also schedule OV. Verbalized understanding.       5/2/2024  Clinical Impression:  1. Acute deep vein thrombosis (DVT) of proximal vein of left lower extremity (HCC)          Disposition:  Discharge  5/2/2024 12:08 am     Follow-up:  Maria De Jesus Yip MD  [address] [facility] [address] [phone]     Schedule an appointment as soon as possible for a visit        Rima Bruno MD  [address] [facility]  [facility] [address] [phone]     Schedule an appointment as soon as possible for a visit  For recheck

## 2024-05-02 NOTE — ED PROVIDER NOTES
Patient Seen in: [facility] In [place]      History     Chief Complaint   Patient presents with    Deep Vein Thrombosis     Stated Complaint: Left calf pain, foot is swollen    Subjective:   HPI    Patient is a 79-year-old gentleman who woke up this morning with left leg swelling and calf tenderness.  No recent surgery or immobilization.  He does have history of having traveled back in March.  No chest pain, no difficulty breathing, no palpitations, no redness, no other associated symptoms.    Objective:   Past Medical History:    Anemia    Blood in the stool    Cancer (HCC)    prostate cancer    Change in hair    Hearing loss    Hemorrhoids    Hypertension    controlled    Macular degeneration    Right Side    Sleep disturbance    Stool incontinence    Wears glasses              Past Surgical History:   Procedure Laterality Date    Circumcision,othr N/A 3/11/2016    Procedure: CIRCUMCISION ADULT;  Surgeon: Ruben Torres MD;  Location: [facility]    Colonoscopy      Laparoscopy, surgical prostatectomy, retropubic radical, w/nerve sparing      Other surgical history  2007    prostatectomy    Other surgical history Left 1991    ORIFOF LEFT KNEE AND LEFT FOREARM    Patient documented not to have experienced any of the following events N/A 3/11/2016    Procedure: CIRCUMCISION ADULT;  Surgeon: Ruben Torres MD;  Location: [facility]    Patient with preoperative order for iv antibiotic surgical site infect N/A 3/11/2016    Procedure: CIRCUMCISION ADULT;  Surgeon: Ruben Torres MD;  Location: [facility]                Social History     Socioeconomic History    Marital status:    Tobacco Use    Smoking status: Former     Types: Cigarettes    Smokeless tobacco: Never    Tobacco comments:     Pt states he smoked socially for 1 year in 8th/9th grade   Vaping Use    Vaping status: Never Used   Substance and Sexual Activity    Alcohol  use: Not Currently    Drug use: No   Other Topics Concern    Caffeine Concern Yes     Comment: 1-2 cups of tea daily    Exercise Yes     Comment: walking daily    Seat Belt Yes              Review of Systems    Positive for stated complaint: Left calf pain, foot is swollen  Other systems are as noted in HPI.  Constitutional and vital signs reviewed.      All other systems reviewed and negative except as noted above.    Physical Exam     ED Triage Vitals [05/01/24 2203]   /79   Pulse 69   Resp 16   Temp 97.6 °F (36.4 °C)   Temp src Temporal   SpO2 99 %   O2 Device None (Room air)       Current:/88   Pulse 67   Temp 97.6 °F (36.4 °C) (Temporal)   Resp 17   Ht 172.7 cm (5' 8\")   Wt 65.8 kg   SpO2 99%   BMI 22.05 kg/m²         Physical Exam    Constitutional: No apparent distress  Eyes: No scleral icterus  Heart: regular rate rhythm, no murmurs  Lungs: Clear to auscultation bilaterally  Extremities: Left leg edema and tenderness noted.  No redness, no warmth.  Distally neurovascularly intact.  Neuro: Alert and oriented ×3    ED Course   Labs Reviewed - No data to display                 MDM      Extensive differential diagnosis was considered for the patient including DVT, PE, cellulitis, abscess, and other pathology.    History and physical exam findings point towards a DVT.  History, physical exam and workup ruled out PE, infectious pathology or other life-threatening complication.  Ultrasound confirmed diagnosis of DVT.    Patient has no history of renal impairment or significant bleeding risk.  Dose of Xarelto administered and prescription sent in.  Patient will follow-up closely with his primary care physician and hematology.  Educated on reasons to seek emergency medical care if worsening symptoms were to develop.  He demonstrated understanding, he is comfortable with the plan and will follow-up as directed.        Disposition and Plan     Clinical Impression:  1. Acute deep vein thrombosis  (DVT) of proximal vein of left lower extremity (HCC)         Disposition:  Discharge  5/2/2024 12:08 am    Follow-up:  Maria De Jesus Yip MD  [address] [facility] [address] [phone]    Schedule an appointment as soon as possible for a visit      Rima Bruno MD  [address] [facility]  [facility] [address] [phone]    Schedule an appointment as soon as possible for a visit  For recheck          Medications Prescribed:  Current Discharge Medication List        START taking these medications    Details   rivaroxaban 15 & 20 MG Oral Tablet Therapy Pack Take As Directed based on package instructions: Days 1-21: 15 mg by mouth twice daily Days 22-30: 20 mg by mouth once daily  Qty: 51 each, Refills: 0

## 2024-05-02 NOTE — TELEPHONE ENCOUNTER
Pt called stating he went to the [place] ER yesterday and they found a blood clot in pts left leg    Pt stated they prescribed a medication for the blood clot and was to follow up with his PCP    Offered RP's next on 5/16/24 Thurs    Pt declined and is asking if he can be seen sooner?    RP-please advise, ty!

## 2024-05-07 ENCOUNTER — OFFICE VISIT (OUTPATIENT)
Dept: INTERNAL MEDICINE CLINIC | Facility: CLINIC | Age: 79
End: 2024-05-07
Payer: MEDICARE

## 2024-05-07 VITALS
DIASTOLIC BLOOD PRESSURE: 82 MMHG | BODY MASS INDEX: 22.79 KG/M2 | HEIGHT: 66.75 IN | RESPIRATION RATE: 12 BRPM | WEIGHT: 145.19 LBS | TEMPERATURE: 98 F | HEART RATE: 67 BPM | OXYGEN SATURATION: 100 % | SYSTOLIC BLOOD PRESSURE: 134 MMHG

## 2024-05-07 DIAGNOSIS — I82.432 ACUTE DEEP VEIN THROMBOSIS (DVT) OF POPLITEAL VEIN OF LEFT LOWER EXTREMITY (HCC): Primary | ICD-10-CM

## 2024-05-07 PROCEDURE — 99214 OFFICE O/P EST MOD 30 MIN: CPT | Performed by: INTERNAL MEDICINE

## 2024-05-07 NOTE — PROGRESS NOTES
Buck Melgar is a 79 year old male.   HPI:   Patient presents for follow up on recent emergency department visit.    He presented to [place] ED on 5/1/24 with one day history of leg swelling in left leg.  Ultrasound showed DVT of left popliteal, posterior tibial veins.  Patient started on Xarelto starter pack.  He has appointment with Hematology in two days.  No recent travels - last trip was in February/early March.  No recent immobility.  No recent surgeries.  He notes he had an accident 30+ years ago, had surgery on left arm and left leg at that time.  Denies chest pain, shortness of breath, palpitations currently.    Past medical, family, surgical and social history were reviewed as listed in the chart, and are unchanged from previous visit on 3/21/2024  REVIEW OF SYSTEMS:   GENERAL/ const: no fevers/chills, no unintentional weight loss  SKIN: denies any unusual skin lesions  EYES:no vision problems  HEENT: denies sinus pain or sinus tenderness  LUNGS: denies shortness of breath   CARDIOVASCULAR: denies chest pain  GI: denies nausea/emesis/ abdominal pain diarrhea constipation  : denies dysuria   MUSCULOSKELETAL: left calf swellling  NEURO: denies headaches  PSYCHIATRIC: denies issues  ENDOCRINE: no hot/cold intolerance  ALLERGY:   Allergies   Allergen Reactions    Seasonal ITCHING     PAST HISTORY:     Current Outpatient Medications:     rivaroxaban 15 & 20 MG Oral Tablet Therapy Pack, Take As Directed based on package instructions: Days 1-21: 15 mg by mouth twice daily Days 22-30: 20 mg by mouth once daily, Disp: 51 each, Rfl: 0    guaiFENesin-codeine 100-10 MG/5ML Oral Solution, Take 5 mL by mouth 3 (three) times daily as needed for cough or congestion., Disp: 118 mL, Rfl: 0    Polyethyl Glycol-Propyl Glycol (SYSTANE OP), Apply to eye daily as needed. (Patient not taking: Reported on 5/1/2024), Disp: , Rfl:     Multiple Vitamins-Minerals (PRESERVISION AREDS) Oral Tab, Take 2 tablets by mouth daily., Disp: ,  Rfl:     Calcium Carbonate-Vitamin D (CALCIUM 600-D OR), Take 1 tablet by mouth daily., Disp: , Rfl:     Multiple Vitamin (ONE-DAILY MULTI VITAMINS) Oral Tab, Take 1 tablet by mouth daily., Disp: , Rfl:   Medical:  has a past medical history of Anemia (9/24/2016), Blood in the stool, Cancer (HCC) (2007), Change in hair, Hearing loss, Hemorrhoids, Hypertension, Macular degeneration, Sleep disturbance, Stool incontinence, and Wears glasses.  Surgical:  has a past surgical history that includes other surgical history (2007); other surgical history (Left, 1991); circumcision,othr (N/A, 3/11/2016); patient with preoperative order for iv antibiotic surgical site infect (N/A, 3/11/2016); patient documented not to have experienced any of the following events (N/A, 3/11/2016); colonoscopy; and laparoscopy, surgical prostatectomy, retropubic radical, w/nerve sparing.  Family: Family history is unknown by patient.  Social:  reports that he has quit smoking. His smoking use included cigarettes. He has never used smokeless tobacco. He reports that he does not currently use alcohol. He reports that he does not use drugs.  Wt Readings from Last 6 Encounters:   05/07/24 145 lb 3.2 oz (65.9 kg)   05/01/24 145 lb (65.8 kg)   03/21/24 143 lb 9.6 oz (65.1 kg)   12/04/23 139 lb 12.8 oz (63.4 kg)   12/02/22 146 lb 6.4 oz (66.4 kg)   11/10/22 150 lb (68 kg)     EXAM:   /82 (BP Location: Right arm, Patient Position: Sitting, Cuff Size: adult)   Pulse 67   Temp 97.9 °F (36.6 °C) (Temporal)   Resp 12   Ht 5' 6.75\" (1.695 m)   Wt 145 lb 3.2 oz (65.9 kg)   SpO2 100%   BMI 22.91 kg/m²   GENERAL: Alert and oriented, well developed, well nourished,in no apparent distress  SKIN: no rashes,no suspicious lesions  HEENT: atraumatic, PERRLA, EOMI, normal lid and conjunctiva  NECK: supple, no jvd, no thyromegaly  LUNGS: clear to auscultation bilaterally, no wheezing/rubs  CARDIO: RRR without murmurs.  No clubbing, cyanosis; mild swelling  of left calf  GI: soft non tender nondistended no hepatosplenomegaly, bowel sounds throughout  NEURO: CN II-XII intact, 5/5 strength all extremities  MS: Full ROM, no joint pain  PSYCH: pleasant, appropriate mood and affect  ASSESSMENT AND PLAN:   1. Acute deep vein thrombosis (DVT) of popliteal vein of left lower extremity (HCC)  Patient noticed left calf swelling x 1 day, first noticed when showering.  He went to [facility] emergency department, ultrasound showed DVT.  Started on Xarelto starter pack.  Has appointment with Hematology this week.  DVT appears unprovoked, last travel was almost two months ago, no major immobility (walks daily), no recent surgeries. Notes distant left leg surgery 30+ years ago.  Will see what Hematology (Dr. Lantigua) thinks about length of anticoagulation and if hypercoagulable work up is warranted.  Advised patient to call or go to emergency department with any significant shortness of breath, chest pain, palpitations.    Patient Care Team:  Maria De Jesus Yip MD as PCP - General (Internal Medicine)  Rosa Reese MD (Radiation Oncology)  Cristopher Stanford, RN as Registered Nurse (Registered Nurse)  Abby Mondragon DO (GASTROENTEROLOGY)  Wade Durant MD as Consulting Physician (NEUROLOGY)  Eric Lantigua MD (Hematology and Oncology)  The patient indicates understanding of these issues and agrees to the plan.  The patient is asked to return to clinic in 3-6 months for follow up on chronic issues/MA Supervisit, or earlier if acute issues arise.    Maria De Jesus Yip MD

## 2024-05-07 NOTE — PATIENT INSTRUCTIONS
- Continue Xarelto.  Ok to switch to breakfast and dinner instead of lunch and dinner  - Follow up with Dr. Lantigua as scheduled  - He will help us decide how long you need to continue to blood thinners.  - Ok to continue with walks/walking.  Avoid heavy running/anything that puts severe pressure on the left leg for now.    It was a pleasure seeing you in the clinic today.  Thank you for choosing the [facility] office for your healthcare needs. Please call at [phone] with any questions or concerns.    Maria De Jesus Yip MD

## 2024-05-09 ENCOUNTER — OFFICE VISIT (OUTPATIENT)
Dept: HEMATOLOGY/ONCOLOGY | Facility: HOSPITAL | Age: 79
End: 2024-05-09
Attending: INTERNAL MEDICINE
Payer: MEDICARE

## 2024-05-09 VITALS
SYSTOLIC BLOOD PRESSURE: 155 MMHG | DIASTOLIC BLOOD PRESSURE: 79 MMHG | HEART RATE: 75 BPM | TEMPERATURE: 97 F | WEIGHT: 144 LBS | RESPIRATION RATE: 14 BRPM | BODY MASS INDEX: 22.87 KG/M2 | OXYGEN SATURATION: 99 % | HEIGHT: 66.34 IN

## 2024-05-09 DIAGNOSIS — Z85.46 HISTORY OF PROSTATE CANCER: ICD-10-CM

## 2024-05-09 DIAGNOSIS — I82.432 ACUTE DEEP VEIN THROMBOSIS (DVT) OF POPLITEAL VEIN OF LEFT LOWER EXTREMITY (HCC): Primary | ICD-10-CM

## 2024-05-09 PROCEDURE — 99205 OFFICE O/P NEW HI 60 MIN: CPT | Performed by: INTERNAL MEDICINE

## 2024-05-09 PROCEDURE — G2211 COMPLEX E/M VISIT ADD ON: HCPCS | Performed by: INTERNAL MEDICINE

## 2024-05-09 NOTE — PROGRESS NOTES
Hematology/Oncology Initial Consultation Note    Patient Name: Buck Melgar  Medical Record Number: [id number]    YOB: 1945   Date of Consultation: 5/9/2024   PCP: Maria De Jesus Yip MD    Reason for Consultation:  Buck Melgar was seen today for the diagnosis of LLE DVT    History of Present Illness:      78 y/o M PMH prostate ca s/p prostatectomy 2014 who presents for LLE DVT.     - recent PSA undetectable  - recently traveled to [facility] 03/2024  - he noticed some swelling and L foot skin changes  - 5/1/24: LLE US with occlusive thrombus in popliteal and tibial vein  - now on xarelto; he denies any pain at all, persistent LLE swelling    Past Medical History:  Past Medical History:    Anemia    Blood in the stool    Cancer (HCC)    prostate cancer    Change in hair    Hearing loss    Hemorrhoids    Hypertension    controlled    Macular degeneration    Right Side    Sleep disturbance    Stool incontinence    Wears glasses       Past Surgical History:   Procedure Laterality Date    Circumcision,othr N/A 3/11/2016    Procedure: CIRCUMCISION ADULT;  Surgeon: Ruben Torres MD;  Location: [facility]    Colonoscopy      Laparoscopy, surgical prostatectomy, retropubic radical, w/nerve sparing      Other surgical history  2007    prostatectomy    Other surgical history Left 1991    ORIFOF LEFT KNEE AND LEFT FOREARM    Patient documented not to have experienced any of the following events N/A 3/11/2016    Procedure: CIRCUMCISION ADULT;  Surgeon: Ruben Torres MD;  Location: [facility]    Patient with preoperative order for iv antibiotic surgical site infect N/A 3/11/2016    Procedure: CIRCUMCISION ADULT;  Surgeon: Ruben Torres MD;  Location: [facility]       Home Medications:  No outpatient medications have been marked as taking for the 5/9/24 encounter (Appointment) with Eric Lantigua MD.     -------  Current Outpatient Medications on File Prior to  Visit   Medication Sig Dispense Refill    rivaroxaban 15 & 20 MG Oral Tablet Therapy Pack Take As Directed based on package instructions: Days 1-21: 15 mg by mouth twice daily Days 22-30: 20 mg by mouth once daily 51 each 0    guaiFENesin-codeine 100-10 MG/5ML Oral Solution Take 5 mL by mouth 3 (three) times daily as needed for cough or congestion. 118 mL 0    Polyethyl Glycol-Propyl Glycol (SYSTANE OP) Apply to eye daily as needed. (Patient not taking: Reported on 5/1/2024)      Multiple Vitamins-Minerals (PRESERVISION AREDS) Oral Tab Take 2 tablets by mouth daily.      Calcium Carbonate-Vitamin D (CALCIUM 600-D OR) Take 1 tablet by mouth daily.      Multiple Vitamin (ONE-DAILY MULTI VITAMINS) Oral Tab Take 1 tablet by mouth daily.       Current Facility-Administered Medications on File Prior to Visit   Medication Dose Route Frequency Provider Last Rate Last Admin    [COMPLETED] rivaroxaban (Xarelto) tab 15 mg  15 mg Oral Once Katey Amor MD   15 mg at 05/02/24 0017       Allergies:   Allergies   Allergen Reactions    Seasonal ITCHING       Psychosocial History:  Social History     Social History Narrative    Not on file     Social History     Socioeconomic History    Marital status:    Tobacco Use    Smoking status: Former     Types: Cigarettes    Smokeless tobacco: Never    Tobacco comments:     Pt states he smoked socially for 1 year in 8th/9th grade   Vaping Use    Vaping status: Never Used   Substance and Sexual Activity    Alcohol use: Not Currently    Drug use: No   Other Topics Concern    Caffeine Concern Yes     Comment: 1-2 cups of tea daily    Exercise Yes     Comment: walking daily    Seat Belt Yes       Family Medical History:  Family History   Family history unknown: Yes       Review of Systems:  A 10-point ROS was done with pertinent positives and negative per the HPI    Vital Signs:  Height: --  Weight: --  BSA (Calculated - sq m): --  Pulse: --  BP: --  Temp: --  Do Not Use - Resp  Rate: --  SpO2: --    Wt Readings from Last 6 Encounters:   24 65.9 kg (145 lb 3.2 oz)   24 65.8 kg (145 lb)   24 65.1 kg (143 lb 9.6 oz)   23 63.4 kg (139 lb 12.8 oz)   22 66.4 kg (146 lb 6.4 oz)   11/10/22 68 kg (150 lb)       Physical Examination:  General: Patient is alert and oriented, not in acute distress  Psych: Mood and affect are appropriate  Eyes: EOMI, PERRL  ENT: Oropharynx is clear, no adenopathy  CV: no LE edema. LLE size > RLE  Respiratory: Non-labored respirations  GI/Abd: Soft, non-tender   Neurological: Grossly intact   Lymphatics: No palpable cervical, supraclavicular, axillary, or inguinal lymphadenopathy  Skin: no rashes or petechiae    Laboratory:  Lab Results   Component Value Date    WBC 8.1 2023    WBC 9.7 2022    WBC 9.5 2021    HGB 13.6 2023    HGB 14.1 2022    HGB 14.0 2021    HCT 41.1 2023    MCV 91.1 2023    MCH 30.2 2023    MCHC 33.1 2023    RDW 13.0 2023    .0 2023    .0 2022    .0 2021     Lab Results   Component Value Date    GLU 90 2023    BUN 14 2023    BUNCREA 9.9 (L) 11/10/2020    CREATSERUM 1.12 2023    CREATSERUM 1.12 2022    CREATSERUM 1.10 2021    ANIONGAP 3 2023    GFR 82 10/03/2017    GFRNAA 65 2022    GFRAA 76 2022    CA 9.0 2023    OSMOCALC 294 2023    ALKPHO 75 2023    AST 21 2023    ALT 19 2023    BILT 0.6 2023    TP 6.9 2023    ALB 3.6 2023    GLOBULIN 3.3 2023     2023    K 4.8 2023     2023    CO2 29.0 2023     Lab Results   Component Value Date    INR 1.05 10/27/2014       Imagin/1/24 US reviewed:  CONCLUSION:  There is occlusive thrombus left distal popliteal vein as well as proximal and mid posterior tibial vein.  There is partially occlusive thrombus in the proximal popliteal vein.  The  other veins above the knee are patent.     Impression & Plan:     LLE DVT  - provoked by travel  - tolerating xarelto without issues  - recommended 3 months therapeutic anticoagulation with xarelto  - discussed best practices: frequent ambulation, hydration, compression stockings  - discussed in future to take xarelto day before, day of and day after travel to limit VTE recurrence given travel identified as risk factor    Hx prostate cancer  - s/p prostatectomy 2014, no evidence of recurrence with undetectable PSA    Eirc Lantigua MD  Hematology/Medical Oncology  [facility]

## 2024-05-09 NOTE — PROGRESS NOTES
Here for LLE DVT. Taking Xarelto without any noticeable complications. No pain, shortness of breath, chest pain.

## 2024-05-22 DIAGNOSIS — I82.432 ACUTE DEEP VEIN THROMBOSIS (DVT) OF POPLITEAL VEIN OF LEFT LOWER EXTREMITY (HCC): ICD-10-CM

## 2024-12-09 ENCOUNTER — OFFICE VISIT (OUTPATIENT)
Dept: INTERNAL MEDICINE CLINIC | Facility: CLINIC | Age: 79
End: 2024-12-09
Payer: MEDICARE

## 2024-12-09 VITALS
OXYGEN SATURATION: 99 % | TEMPERATURE: 97 F | SYSTOLIC BLOOD PRESSURE: 118 MMHG | RESPIRATION RATE: 12 BRPM | WEIGHT: 142.63 LBS | HEIGHT: 66.5 IN | BODY MASS INDEX: 22.65 KG/M2 | HEART RATE: 75 BPM | DIASTOLIC BLOOD PRESSURE: 70 MMHG

## 2024-12-09 DIAGNOSIS — H35.30 MACULAR DEGENERATION OF RIGHT EYE, UNSPECIFIED TYPE: ICD-10-CM

## 2024-12-09 DIAGNOSIS — Z23 NEED FOR IMMUNIZATION AGAINST INFLUENZA: ICD-10-CM

## 2024-12-09 DIAGNOSIS — R51.9 CHRONIC RIGHT-SIDED HEADACHE: ICD-10-CM

## 2024-12-09 DIAGNOSIS — I77.810 DILATATION OF THORACIC AORTA (HCC): Chronic | ICD-10-CM

## 2024-12-09 DIAGNOSIS — Z86.718 HISTORY OF DVT OF LOWER EXTREMITY: ICD-10-CM

## 2024-12-09 DIAGNOSIS — Z85.46 HISTORY OF PROSTATE CANCER: ICD-10-CM

## 2024-12-09 DIAGNOSIS — G89.29 CHRONIC RIGHT-SIDED HEADACHE: ICD-10-CM

## 2024-12-09 DIAGNOSIS — Z00.00 ENCOUNTER FOR SUBSEQUENT ANNUAL WELLNESS VISIT (AWV) IN MEDICARE PATIENT: Primary | ICD-10-CM

## 2024-12-09 DIAGNOSIS — L91.8 SKIN TAG: ICD-10-CM

## 2024-12-09 NOTE — PROGRESS NOTES
Subjective:   Buck Melgar is a 79 year old male who presents for a Medicare Subsequent Annual Wellness visit (Pt already had Initial Annual Wellness) and scheduled follow up of multiple significant but stable problems.   Preventative health - due for labs, flu shot. Body mass index is 22.67 kg/m².  History of prostate cancer - had radiation therapy, undetectable PSA last year.  Did have acute DVT of arm this spring.  Established with Heme/Onc (Dr. Lantigua) - on Xarelto prophylaxis now. Occasional pain in left leg still.  Left gluteal area.    Dilatation of thoracic aorta - no chest pain or shortness of breath.  Echo was ordered last year but not done.  Chronic right-sided headaches - stable symptoms, 1-2 times a week.  Has seen Neurology for this issue in the past.   Macular degeneration - following with Ophthalmology; has appointment with Dr. Wong in March.  He has a skin tag in left gluteal area - painless.    History/Other:   Fall Risk Assessment:   He has been screened for Falls and is low risk.      Cognitive Assessment:   He had a completely normal cognitive assessment - see flowsheet entries       Functional Ability/Status:   Buck Melgar has some abnormal functions as listed below:  He has Hearing problems based on screening of functional status.He has Vision problems based on screening of functional status.       Depression Screening (PHQ):  PHQ-2 SCORE: 0  , done 12/9/2024             Advanced Directives:   He does NOT have a Living Will. [Do you have a living will?: No]  He does NOT have a Power of  for Health Care. [Do you have a healthcare power of ?: No]  Discussed Advance Care Planning with patient (and family/surrogate if present). Standard forms made available to patient in After Visit Summary.      Patient Active Problem List   Diagnosis    History of prostate cancer    Personal history of colonic polyps    Benign neoplasm of cecum    Dilatation of thoracic aorta (HCC)    Macular  degeneration of right eye    Chronic right-sided headache    Pruritus ani    Benign neoplasm of rectum    Acute deep vein thrombosis (DVT) of popliteal vein of left lower extremity (HCC)    Skin tag     Allergies:  He is allergic to seasonal.    Current Medications:  Outpatient Medications Marked as Taking for the 12/9/24 encounter (Office Visit) with Maria De Jesus Yip MD   Medication Sig    Polyethyl Glycol-Propyl Glycol (SYSTANE OP) Apply to eye daily as needed.    Multiple Vitamins-Minerals (PRESERVISION AREDS) Oral Tab Take 2 tablets by mouth daily.    Calcium Carbonate-Vitamin D (CALCIUM 600-D OR) Take 1 tablet by mouth daily.    Multiple Vitamin (ONE-DAILY MULTI VITAMINS) Oral Tab Take 1 tablet by mouth daily.       Medical History:  He  has a past medical history of Anemia (9/24/2016), Blood in the stool, Cancer ([facility]) (2007), Change in hair, Hearing loss, Hemorrhoids, Hypertension, Macular degeneration, Sleep disturbance, Stool incontinence, and Wears glasses.  Surgical History:  He  has a past surgical history that includes other surgical history (2007); other surgical history (Left, 1991); circumcision,othr (N/A, 3/11/2016); patient with preoperative order for iv antibiotic surgical site infect (N/A, 3/11/2016); patient documented not to have experienced any of the following events (N/A, 3/11/2016); colonoscopy; and laparoscopy, surgical prostatectomy, retropubic radical, w/nerve sparing.   Family History:  His Family history is unknown by patient.  Social History:  He  reports that he has quit smoking. His smoking use included cigarettes. He has never used smokeless tobacco. He reports current alcohol use. He reports that he does not use drugs.    Tobacco:  He smoked tobacco in the past but quit greater than 12 months ago.  Social History     Tobacco Use   Smoking Status Former    Types: Cigarettes   Smokeless Tobacco Never   Tobacco Comments    Pt states he smoked socially for 1 year in 8th/9th grade         CAGE Alcohol Screen:   CAGE screening score of 0 on 12/9/2024, showing low risk of alcohol abuse.      Patient Care Team:  Maria De Jesus Yip MD as PCP - General (Internal Medicine)  Rosa Reese MD (Radiation Oncology)  Cristopher Stanford, RN as Registered Nurse (Registered Nurse)  Abby Mondragon DO (GASTROENTEROLOGY)  Wade Durant MD as Consulting Physician (NEUROLOGY)  Eric Lantigua MD (Hematology and Oncology)  Stephanie Collier MD (OTOLARYNGOLOGY)  Jaziel Wong MD (OPHTHALMOLOGY)    Review of Systems  GENERAL: feels well otherwise  SKIN: skin tag gluteal area  EYES: denies blurred vision or double vision  HEENT: denies nasal congestion, sinus pain or ST  LUNGS: denies shortness of breath with exertion  CARDIOVASCULAR: denies chest pain on exertion  GI: denies abdominal pain, denies heartburn  : no complaint of urinary incontinence  MUSCULOSKELETAL: occasional/rare back pain  NEURO: chronic headaches  PSYCHE: denies depression or anxiety  HEMATOLOGIC: denies hx of anemia  ENDOCRINE: denies thyroid history  ALL/ASTHMA: denies hx of allergy or asthma    Objective:   Physical Exam  /70 (BP Location: Left arm, Patient Position: Sitting, Cuff Size: adult)   Pulse 75   Temp 97.3 °F (36.3 °C) (Temporal)   Resp 12   Ht 5' 6.5\" (1.689 m)   Wt 142 lb 9.6 oz (64.7 kg)   SpO2 99%   BMI 22.67 kg/m²  Estimated body mass index is 22.67 kg/m² as calculated from the following:    Height as of this encounter: 5' 6.5\" (1.689 m).    Weight as of this encounter: 142 lb 9.6 oz (64.7 kg).  Medicare Hearing Assessment:   Hearing Screening    Time taken: 12/9/2024  8:23 AM  Entry User: Cathy Ro CMA  Screening Method: Finger Rub  Finger Rub Result: Fail (Comment: Left Ear)           GENERAL: Alert and oriented, well developed, well nourished,in no apparent distress  SKIN: no rashes,no suspicious lesions  HEENT: atraumatic, PERRLA, EOMI, normal lid and conjunctiva  NECK: supple, no jvd, no  thyromegaly  LUNGS: clear to auscultation bilaterally, no wheezing/rubs  CARDIO: RRR without murmurs.  No clubbing, cyanosis or edema.  GI: soft non tender nondistended no hepatosplenomegaly, bowel sounds throughout  NEURO: CN II-XII intact, 5/5 strength all extremities  MS: Full ROM  PSYCH: pleasant, appropriate mood and affect    Assessment & Plan:   Buck Melgar is a 79 year old male who presents for a Medicare Assessment.   1. Encounter for subsequent annual wellness visit (AWV) in Medicare patient  2. Need for immunization against influenza  3. History of prostate cancer  4. History of DVT of lower extremity  Age appropriate health guidance and counseling provided.  Labs ordered as below.  Hx of prostate cancer with prostatectomy, radiation.  Will check PSA.  DVT in May - on Xarelto.  Some hardness in left leg, this has been chronic since his DVT.  Will check updated LE doppler.    - CBC With Differential With Platelet; Future  - Comp Metabolic Panel (14); Future  - Lipid Panel; Future  - High Dose Fluzone trivalent influenza, 65 yrs+ PFS [66210]  - PSA Total, Diagnostic; Future  - US VENOUS DOPPLER LEG LEFT - DIAG IMG (CPT=93971); Future    5. Dilatation of thoracic aorta (HCC)  No chest pain or shortness of breath.  Re-ordered echocardiogram as last order has .  - CARD ECHO 2D DOPPLER (CPT=93306); Future    6. Chronic right-sided headache  Chronic issue, stable; 1-2 times a week. Has seen Neurology for this in the past.  No change in severity or nature of headaches.    7. Macular degeneration of right eye, unspecified type  Follows with Ophthalmology - has appointment with Dr. Wong in March.    8. Skin tag  Left gluteal area.  Painless.  Monitor for now.    The patient indicates understanding of these issues and agrees to the plan.  Reinforced healthy diet, lifestyle, and exercise.      Return in about 1 year (around 2025).     Maria De Jesus Yip MD, 2024     Supplementary Documentation:    General Health:  In the past six months, have you lost more than 10 pounds without trying?: 2 - No  Has your appetite been poor?: No  Type of Diet: Vegetarian  How does the patient maintain a good energy level?: Appropriate Exercise;Daily Walks;Stretching;Other  How would you describe your daily physical activity?: Light  How would you describe your current health state?: Fair  How do you maintain positive mental well-being?: Social Interaction;Puzzles;Games;Visiting Friends  On a scale of 0 to 10, with 0 being no pain and 10 being severe pain, what is your pain level?: 2 - (Mild)  In the past six months, have you experienced urine leakage?: 1-Yes  At any time do you feel concerned for the safety/well-being of yourself and/or your children, in your home or elsewhere?: No  Have you had any immunizations at another office such as Influenza, Hepatitis B, Tetanus, or Pneumococcal?: No    Health Maintenance   Topic Date Due    Annual Depression Screening  01/01/2024    COVID-19 Vaccine (9 - 2024-25 season) 09/01/2024    Influenza Vaccine (1) 10/01/2024    Annual Physical  12/04/2024    PSA  12/01/2024    Colorectal Cancer Screening  06/27/2027    Fall Risk Screening (Annual)  Completed    Pneumococcal Vaccine: 65+ Years  Completed    Zoster Vaccines  Completed

## 2024-12-09 NOTE — PATIENT INSTRUCTIONS
- Flu shot given today  - Get blood tests done when fasting (water and medications only for 8 hours)  - Schedule echocardiogram (heart ultrasound) and lower extremity doppler (left leg blood clot ultrasound).  Call central scheduling at [phone] to schedule these tests.  - Follow up in 1 year for Medicare Wellness Visit/chronic issues; follow up earlier as needed.    It was a pleasure seeing you in the clinic today.  Thank you for choosing the [facility] office for your healthcare needs. Please call at [phone] with any questions or concerns.    Maria De Jesus Yip MD

## 2024-12-10 ENCOUNTER — LAB ENCOUNTER (OUTPATIENT)
Dept: LAB | Facility: HOSPITAL | Age: 79
End: 2024-12-10
Attending: INTERNAL MEDICINE
Payer: MEDICARE

## 2024-12-10 ENCOUNTER — HOSPITAL ENCOUNTER (OUTPATIENT)
Dept: ULTRASOUND IMAGING | Facility: HOSPITAL | Age: 79
Discharge: HOME | End: 2024-12-10
Attending: INTERNAL MEDICINE
Payer: MEDICARE

## 2024-12-10 DIAGNOSIS — Z00.00 ENCOUNTER FOR SUBSEQUENT ANNUAL WELLNESS VISIT (AWV) IN MEDICARE PATIENT: ICD-10-CM

## 2024-12-10 DIAGNOSIS — Z86.718 HISTORY OF DVT OF LOWER EXTREMITY: ICD-10-CM

## 2024-12-10 DIAGNOSIS — Z85.46 HISTORY OF PROSTATE CANCER: ICD-10-CM

## 2024-12-10 LAB
ALBUMIN SERPL-MCNC: 3.9 G/DL (ref 3.2–4.8)
ALBUMIN/GLOB SERPL: 1.3 {RATIO} (ref 1–2)
ALP LIVER SERPL-CCNC: 73 U/L
ALT SERPL-CCNC: 16 U/L
ANION GAP SERPL CALC-SCNC: 5 MMOL/L (ref 0–18)
AST SERPL-CCNC: 23 U/L (ref ?–34)
BASOPHILS # BLD AUTO: 0.05 X10(3) UL (ref 0–0.2)
BASOPHILS NFR BLD AUTO: 0.7 %
BILIRUB SERPL-MCNC: 1 MG/DL (ref 0.2–1.1)
BUN BLD-MCNC: 14 MG/DL (ref 9–23)
CALCIUM BLD-MCNC: 9.1 MG/DL (ref 8.7–10.4)
CHLORIDE SERPL-SCNC: 109 MMOL/L (ref 98–112)
CHOLEST SERPL-MCNC: 190 MG/DL (ref ?–200)
CO2 SERPL-SCNC: 29 MMOL/L (ref 21–32)
CREAT BLD-MCNC: 1.04 MG/DL
EGFRCR SERPLBLD CKD-EPI 2021: 74 ML/MIN/1.73M2 (ref 60–?)
EOSINOPHIL # BLD AUTO: 0.21 X10(3) UL (ref 0–0.7)
EOSINOPHIL NFR BLD AUTO: 2.8 %
ERYTHROCYTE [DISTWIDTH] IN BLOOD BY AUTOMATED COUNT: 12.7 %
FASTING PATIENT LIPID ANSWER: YES
FASTING STATUS PATIENT QL REPORTED: YES
GLOBULIN PLAS-MCNC: 3 G/DL (ref 2–3.5)
GLUCOSE BLD-MCNC: 88 MG/DL (ref 70–99)
HCT VFR BLD AUTO: 40.7 %
HDLC SERPL-MCNC: 62 MG/DL (ref 40–59)
HGB BLD-MCNC: 13.6 G/DL
IMM GRANULOCYTES # BLD AUTO: 0.01 X10(3) UL (ref 0–1)
IMM GRANULOCYTES NFR BLD: 0.1 %
LDLC SERPL CALC-MCNC: 111 MG/DL (ref ?–100)
LYMPHOCYTES # BLD AUTO: 2.95 X10(3) UL (ref 1–4)
LYMPHOCYTES NFR BLD AUTO: 39.3 %
MCH RBC QN AUTO: 30.2 PG (ref 26–34)
MCHC RBC AUTO-ENTMCNC: 33.4 G/DL (ref 31–37)
MCV RBC AUTO: 90.4 FL
MONOCYTES # BLD AUTO: 0.75 X10(3) UL (ref 0.1–1)
MONOCYTES NFR BLD AUTO: 10 %
NEUTROPHILS # BLD AUTO: 3.53 X10 (3) UL (ref 1.5–7.7)
NEUTROPHILS # BLD AUTO: 3.53 X10(3) UL (ref 1.5–7.7)
NEUTROPHILS NFR BLD AUTO: 47.1 %
NONHDLC SERPL-MCNC: 128 MG/DL (ref ?–130)
OSMOLALITY SERPL CALC.SUM OF ELEC: 296 MOSM/KG (ref 275–295)
PLATELET # BLD AUTO: 230 10(3)UL (ref 150–450)
POTASSIUM SERPL-SCNC: 4.5 MMOL/L (ref 3.5–5.1)
PROT SERPL-MCNC: 6.9 G/DL (ref 5.7–8.2)
PSA SERPL-MCNC: <0.04 NG/ML (ref ?–4)
RBC # BLD AUTO: 4.5 X10(6)UL
SODIUM SERPL-SCNC: 143 MMOL/L (ref 136–145)
TRIGL SERPL-MCNC: 96 MG/DL (ref 30–149)
VLDLC SERPL CALC-MCNC: 16 MG/DL (ref 0–30)
WBC # BLD AUTO: 7.5 X10(3) UL (ref 4–11)

## 2024-12-10 PROCEDURE — 80053 COMPREHEN METABOLIC PANEL: CPT

## 2024-12-10 PROCEDURE — 80061 LIPID PANEL: CPT

## 2024-12-10 PROCEDURE — 93971 EXTREMITY STUDY: CPT | Performed by: INTERNAL MEDICINE

## 2024-12-10 PROCEDURE — 84153 ASSAY OF PSA TOTAL: CPT

## 2024-12-10 PROCEDURE — 85025 COMPLETE CBC W/AUTO DIFF WBC: CPT

## 2024-12-10 PROCEDURE — 36415 COLL VENOUS BLD VENIPUNCTURE: CPT

## 2024-12-11 ENCOUNTER — OFFICE VISIT (OUTPATIENT)
Facility: LOCATION | Age: 79
End: 2024-12-11
Payer: MEDICARE

## 2024-12-11 DIAGNOSIS — H93.13 TINNITUS OF BOTH EARS: ICD-10-CM

## 2024-12-11 DIAGNOSIS — R29.2: Primary | ICD-10-CM

## 2024-12-11 DIAGNOSIS — H90.3 ASYMMETRICAL SENSORINEURAL HEARING LOSS: Primary | ICD-10-CM

## 2024-12-11 DIAGNOSIS — H91.8X3 ASYMMETRICAL HEARING LOSS: ICD-10-CM

## 2024-12-11 PROCEDURE — 99202 OFFICE O/P NEW SF 15 MIN: CPT | Performed by: STUDENT IN AN ORGANIZED HEALTH CARE EDUCATION/TRAINING PROGRAM

## 2024-12-11 PROCEDURE — 92557 COMPREHENSIVE HEARING TEST: CPT | Performed by: AUDIOLOGIST

## 2024-12-11 PROCEDURE — 92567 TYMPANOMETRY: CPT | Performed by: AUDIOLOGIST

## 2024-12-11 NOTE — PROGRESS NOTES
Buck Melgar is a 79 year old male.   Chief Complaint   Patient presents with    Ear Problem    Hearing Check     HPI:   79 year old male presenting for evaluation of bilateral nonpulsatile tinnitus left greater than right.  He reports tinnitus has been present for several years.  He also complains of ear itching.  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  He denies hearing loss, otalgia and otorrhea.  No prior otologic surgeries.      Current Outpatient Medications   Medication Sig Dispense Refill    Polyethyl Glycol-Propyl Glycol (SYSTANE OP) Apply to eye daily as needed.      Multiple Vitamins-Minerals (PRESERVISION AREDS) Oral Tab Take 2 tablets by mouth daily.      Calcium Carbonate-Vitamin D (CALCIUM 600-D OR) Take 1 tablet by mouth daily.      Multiple Vitamin (ONE-DAILY MULTI VITAMINS) Oral Tab Take 1 tablet by mouth daily.        Past Medical History:    Anemia    Blood in the stool    Cancer (HCC)    prostate cancer    Change in hair    Hearing loss    Hemorrhoids    Hypertension    controlled    Macular degeneration    Right Side    Sleep disturbance    Stool incontinence    Wears glasses      Social History:  Social History     Socioeconomic History    Marital status:    Tobacco Use    Smoking status: Former     Types: Cigarettes    Smokeless tobacco: Never    Tobacco comments:     Pt states he smoked socially for 1 year in 8th/9th grade   Vaping Use    Vaping status: Never Used   Substance and Sexual Activity    Alcohol use: Yes     Comment: Social    Drug use: No   Other Topics Concern    Caffeine Concern Yes     Comment: 1-2 cups of tea daily    Exercise Yes     Comment: walking daily    Seat Belt Yes      Past Surgical History:   Procedure Laterality Date    Circumcision,othr N/A 3/11/2016    Procedure: CIRCUMCISION ADULT;  Surgeon: Ruben Torres MD;  Location: [facility] [place], [facility]    Colonoscopy      Laparoscopy, surgical prostatectomy,  retropubic radical, w/nerve sparing      Other surgical history  2007    prostatectomy    Other surgical history Left 1991    ORIFOF LEFT KNEE AND LEFT FOREARM    Patient documented not to have experienced any of the following events N/A 3/11/2016    Procedure: CIRCUMCISION ADULT;  Surgeon: Ruben Torres MD;  Location: [facility]    Patient with preoperative order for iv antibiotic surgical site infect N/A 3/11/2016    Procedure: CIRCUMCISION ADULT;  Surgeon: Ruben Torres MD;  Location: [facility]         REVIEW OF SYSTEMS:   GENERAL HEALTH: feels well otherwise  GENERAL : denies fever, chills, sweats, weight loss, weight gain  SKIN: denies any unusual skin lesions or rashes  RESPIRATORY: denies shortness of breath with exertion  NEURO: denies headaches    EXAM:   There were no vitals taken for this visit.    System Findings Details   Constitutional  Overall appearance - Normal.   Head/Face  Facial features -- Normal. Skull - Normal.   Eyes  Sclera white, pupils equal and round, EOMI   Ears  External ears normal in appearance, EACs patent, TMs intact bilaterally, no evidence of middle ear effusion   Nose  External nose normal in appearance, nares patent   Throat  Posterior pharynx clear, uvula midline, tonsils 1+   Oral cavity  Lips normal in appearance, mucous membranes clear, no masses, FOM soft, tongue without lesions   Neck  Trachea midline, no lymphadenopathy, no masses   Neurological  Memory - Normal. Cranial nerves - Cranial nerves II through XII grossly intact.     Audiogram 12/11/2024  Audiogram: supports a mild sloping to severe sensorineural hearing loss (left more involved than right ear).  NOTE asymmetry in the left ear for pure tones and WRS score.     ASSESSMENT AND PLAN:   79 year old male presenting for evaluation of bilateral nonpulsatile tinnitus left greater than right.     -MRI IAC for asymmetric SNHL  -Medically cleared for hearing aids    The patient  indicates understanding of these issues and agrees to the plan.    Stephanie Collier MD  12/11/2024  3:08 PM

## 2024-12-11 NOTE — PROGRESS NOTES
Buck Melgar was seen for an audiometric evaluation and tympanogram today. Referred back to physician for asymmetrical hearing loss (left ear).  Consider trial use with amplification pending medical clearance.     Shira Liu M.A. [facility]

## 2024-12-16 ENCOUNTER — HOSPITAL ENCOUNTER (OUTPATIENT)
Dept: CV DIAGNOSTICS | Facility: HOSPITAL | Age: 79
Discharge: HOME | End: 2024-12-16
Attending: INTERNAL MEDICINE
Payer: MEDICARE

## 2024-12-16 DIAGNOSIS — I77.810 DILATATION OF THORACIC AORTA (HCC): Chronic | ICD-10-CM

## 2024-12-16 PROCEDURE — 93306 TTE W/DOPPLER COMPLETE: CPT | Performed by: INTERNAL MEDICINE

## 2024-12-17 PROBLEM — I35.1 MILD AORTIC REGURGITATION: Status: ACTIVE | Noted: 2024-12-17

## 2025-01-13 ENCOUNTER — TELEPHONE (OUTPATIENT)
Facility: LOCATION | Age: 80
End: 2025-01-13

## 2025-01-13 ENCOUNTER — HOSPITAL ENCOUNTER (OUTPATIENT)
Dept: MRI IMAGING | Facility: HOSPITAL | Age: 80
Discharge: HOME | End: 2025-01-13
Attending: STUDENT IN AN ORGANIZED HEALTH CARE EDUCATION/TRAINING PROGRAM
Payer: MEDICARE

## 2025-01-13 DIAGNOSIS — H91.8X3 ASYMMETRICAL HEARING LOSS: ICD-10-CM

## 2025-01-13 PROCEDURE — 70553 MRI BRAIN STEM W/O & W/DYE: CPT | Performed by: STUDENT IN AN ORGANIZED HEALTH CARE EDUCATION/TRAINING PROGRAM

## 2025-01-13 PROCEDURE — A9575 INJ GADOTERATE MEGLUMI 0.1ML: HCPCS | Performed by: STUDENT IN AN ORGANIZED HEALTH CARE EDUCATION/TRAINING PROGRAM

## 2025-01-13 RX ORDER — GADOTERATE MEGLUMINE 376.9 MG/ML
15 INJECTION INTRAVENOUS
Status: COMPLETED | OUTPATIENT
Start: 2025-01-13 | End: 2025-01-13

## 2025-01-13 RX ADMIN — GADOTERATE MEGLUMINE 13 ML: 376.9 INJECTION INTRAVENOUS at 13:12:00

## 2025-01-13 NOTE — TELEPHONE ENCOUNTER
To notify him of MRI IAC results.  He expressed understanding.    Stephanie Collier MD, MIKE  Facial Plastic & Reconstructive Surgery, [facility]

## 2025-01-19 ENCOUNTER — PATIENT MESSAGE (OUTPATIENT)
Dept: INTERNAL MEDICINE CLINIC | Facility: CLINIC | Age: 80
End: 2025-01-19

## 2025-01-19 DIAGNOSIS — L91.8 SKIN TAG: Primary | ICD-10-CM

## 2025-01-20 NOTE — TELEPHONE ENCOUNTER
Should be ok with Dermatology - referral entered for Dr. Jiang/Donovan  [address]  [place], [place] [address] [phone]

## 2025-01-20 NOTE — TELEPHONE ENCOUNTER
RP: Please see MCM and below and advise. Skin tag on gluteal area worsening. Requesting recommendation for removal. Please advise, TY    Spoke with patient regarding MCM sent about skin tag on left gluteal region.     LOV: 12/9/2024  \"8. Skin tag  Left gluteal area.  Painless.  Monitor for now.\"    Patient states he now has irritation and there is an obvious red spot on area left side of gluteal area. States he has trouble sitting on toilet due to discomfort. Requesting recommendation for removal.

## 2025-02-04 ENCOUNTER — HOSPITAL ENCOUNTER (OUTPATIENT)
Dept: ULTRASOUND IMAGING | Age: 80
Discharge: HOME | End: 2025-02-04
Attending: STUDENT IN AN ORGANIZED HEALTH CARE EDUCATION/TRAINING PROGRAM
Payer: MEDICARE

## 2025-02-04 DIAGNOSIS — D48.5 NEOPLASM OF UNCERTAIN BEHAVIOR OF SKIN: ICD-10-CM

## 2025-02-04 PROCEDURE — 76857 US EXAM PELVIC LIMITED: CPT | Performed by: STUDENT IN AN ORGANIZED HEALTH CARE EDUCATION/TRAINING PROGRAM

## 2025-03-19 ENCOUNTER — TELEPHONE (OUTPATIENT)
Dept: INTERNAL MEDICINE CLINIC | Facility: CLINIC | Age: 80
End: 2025-03-19

## 2025-08-18 ENCOUNTER — LAB ENCOUNTER (OUTPATIENT)
Dept: LAB | Facility: HOSPITAL | Age: 80
End: 2025-08-18
Attending: INTERNAL MEDICINE

## 2025-08-18 DIAGNOSIS — K92.1 HEMATOCHEZIA: ICD-10-CM

## 2025-08-18 DIAGNOSIS — R19.4 CHANGE IN BOWEL HABITS: ICD-10-CM

## 2025-08-18 LAB
ALBUMIN SERPL-MCNC: 4.2 G/DL (ref 3.2–4.8)
ALBUMIN/GLOB SERPL: 1.6 (ref 1–2)
ALP LIVER SERPL-CCNC: 71 U/L (ref 45–117)
ALT SERPL-CCNC: 14 U/L (ref 10–49)
ANION GAP SERPL CALC-SCNC: 9 MMOL/L (ref 0–18)
AST SERPL-CCNC: 22 U/L (ref ?–34)
BASOPHILS # BLD AUTO: 0.06 X10(3) UL (ref 0–0.2)
BASOPHILS NFR BLD AUTO: 0.8 %
BILIRUB SERPL-MCNC: 0.6 MG/DL (ref 0.2–1.1)
BUN BLD-MCNC: 14 MG/DL (ref 9–23)
CALCIUM BLD-MCNC: 9.1 MG/DL (ref 8.7–10.6)
CHLORIDE SERPL-SCNC: 106 MMOL/L (ref 98–112)
CO2 SERPL-SCNC: 27 MMOL/L (ref 21–32)
CREAT BLD-MCNC: 1.08 MG/DL (ref 0.7–1.3)
EGFRCR SERPLBLD CKD-EPI 2021: 70 ML/MIN/1.73M2 (ref 60–?)
EOSINOPHIL # BLD AUTO: 0.15 X10(3) UL (ref 0–0.7)
EOSINOPHIL NFR BLD AUTO: 1.9 %
ERYTHROCYTE [DISTWIDTH] IN BLOOD BY AUTOMATED COUNT: 12.8 %
FASTING STATUS PATIENT QL REPORTED: NO
GLOBULIN PLAS-MCNC: 2.7 G/DL (ref 2–3.5)
GLUCOSE BLD-MCNC: 104 MG/DL (ref 70–99)
HCT VFR BLD AUTO: 38.7 % (ref 39–53)
HGB BLD-MCNC: 13 G/DL (ref 13–17.5)
IGA SERPL-MCNC: 221.8 MG/DL (ref 40–350)
IMM GRANULOCYTES # BLD AUTO: 0.01 X10(3) UL (ref 0–1)
IMM GRANULOCYTES NFR BLD: 0.1 %
LYMPHOCYTES # BLD AUTO: 3.06 X10(3) UL (ref 1–4)
LYMPHOCYTES NFR BLD AUTO: 39.7 %
MCH RBC QN AUTO: 29.8 PG (ref 26–34)
MCHC RBC AUTO-ENTMCNC: 33.6 G/DL (ref 31–37)
MCV RBC AUTO: 88.8 FL (ref 80–100)
MONOCYTES # BLD AUTO: 0.79 X10(3) UL (ref 0.1–1)
MONOCYTES NFR BLD AUTO: 10.3 %
NEUTROPHILS # BLD AUTO: 3.63 X10 (3) UL (ref 1.5–7.7)
NEUTROPHILS # BLD AUTO: 3.63 X10(3) UL (ref 1.5–7.7)
NEUTROPHILS NFR BLD AUTO: 47.2 %
OSMOLALITY SERPL CALC.SUM OF ELEC: 295 MOSM/KG (ref 275–295)
PLATELET # BLD AUTO: 239 10(3)UL (ref 150–450)
POTASSIUM SERPL-SCNC: 4.5 MMOL/L (ref 3.5–5.1)
PROT SERPL-MCNC: 6.9 G/DL (ref 5.7–8.2)
RBC # BLD AUTO: 4.36 X10(6)UL (ref 3.8–5.8)
SODIUM SERPL-SCNC: 142 MMOL/L (ref 136–145)
TSI SER-ACNC: 2.57 UIU/ML (ref 0.55–4.78)
WBC # BLD AUTO: 7.7 X10(3) UL (ref 4–11)

## 2025-08-18 PROCEDURE — 84443 ASSAY THYROID STIM HORMONE: CPT

## 2025-08-18 PROCEDURE — 82784 ASSAY IGA/IGD/IGG/IGM EACH: CPT

## 2025-08-18 PROCEDURE — 80053 COMPREHEN METABOLIC PANEL: CPT

## 2025-08-18 PROCEDURE — 36415 COLL VENOUS BLD VENIPUNCTURE: CPT

## 2025-08-18 PROCEDURE — 86364 TISS TRNSGLTMNASE EA IG CLAS: CPT

## 2025-08-18 PROCEDURE — 85025 COMPLETE CBC W/AUTO DIFF WBC: CPT

## 2025-08-20 LAB — TTG IGA SER-ACNC: <0.2 U/ML (ref ?–7)

## (undated) NOTE — LETTER
10/27/17    Dear Dr. Columbus Rubinstein      Thank you for referring your patient, Matty Andrews to me for an evaluation. Please see my initial consult note enclosed below. Let me know if you have any questions.     Thank you  Nga Pelayo MD, Neurology  E incontinence or mood issues.      Past Medical History:   Diagnosis Date   • Cancer [facility]) 2007    prostate cancer   • Hypertension     controlled     Past Surgical History:  3/11/2016: CIRCUMCISION,OTHR N/A      Comment: Procedure: CIRCUMCISION ADULT;  Surg PHYSICAL EXAM:   /64   Pulse 84   Resp 18   Estimated body mass index is 23.52 kg/m² as calculated from the following:    Height as of 10/3/17: 67\". Weight as of 10/3/17: 150 lb 3.2 oz.     GENERAL: well developed, well nourished, in no apparent d 2+, symmetric, throughout, except absent ankle jerks bilaterally; toes downgoinig bilaterally; no clonus        Gait:  Normal casual, heel, toe and tandem gait    TEST RESULTS/DATA REVIEWED:     Imaging: reviewed:     MRI brain (12/2011):         FINDINGS: 37.0 - 53.0 % 44.9   Platelet Count      432.4 - 450.0 10(3)uL 286.0   MCV      80.0 - 99.0 fL 93.2   MCH      27.0 - 33.2 pg 29.3   MCHC      31.0 - 37.0 g/dL 31.4   RDW      11.5 - 16.0 % 13.3   RDW-SD      35.1 - 46.3 fL 45.4   CHOLESTEROL, TOTAL (R51) New onset of headaches after age 48  (primary encounter diagnosis)  Plan: MRI BRAIN(W+WO)/MRA BRAIN (CPT=70553/82282)        As noted above     (G43.009) Migraine without aura and without status migrainosus, not intractable  Plan: SUMAtriptan [place]

## (undated) NOTE — LETTER
12/10/21        [address]  [id number]      Dear Tushar Hoyt,    [facility] records indicate that you have outstanding lab work and or testing that was ordered for you and has not yet been completed:  Orders Placed This Encounter

## (undated) NOTE — LETTER
20    Patient: Hortencia Nash  : 1945 Visit date: 2020    Dear  Dr. Cricket Reed MD,    Thank you for referring Hortencia Nash to my practice. Please find my assessment and plan below.              Assessment   Lipoma of right upper extremity  (

## (undated) NOTE — LETTER
12/10/21        Marck Alejandra  [address] [place] [address]  [id number]      Dear Jaziel Elizalde,    [facility] records indicate that you have outstanding lab work and or testing that was ordered for you and has not yet been completed:  Orders Placed This Encounter